# Patient Record
Sex: MALE | Race: WHITE | NOT HISPANIC OR LATINO
[De-identification: names, ages, dates, MRNs, and addresses within clinical notes are randomized per-mention and may not be internally consistent; named-entity substitution may affect disease eponyms.]

---

## 2017-02-06 RX ORDER — LISINOPRIL 5 MG/1
TABLET ORAL
Qty: 90 TABLET | Refills: 0 | Status: SHIPPED | OUTPATIENT
Start: 2017-02-06 | End: 2017-04-11

## 2017-03-06 RX ORDER — SIMVASTATIN 10 MG
TABLET ORAL
Qty: 90 TABLET | Refills: 0 | Status: SHIPPED | OUTPATIENT
Start: 2017-03-06 | End: 2017-06-11

## 2017-04-06 ENCOUNTER — PRIOR ORIGINAL RECORDS (OUTPATIENT)
Dept: OTHER | Age: 62
End: 2017-04-06

## 2017-04-06 ENCOUNTER — HOSPITAL ENCOUNTER (OUTPATIENT)
Dept: CT IMAGING | Facility: HOSPITAL | Age: 62
Discharge: HOME | End: 2017-04-06
Attending: INTERNAL MEDICINE

## 2017-04-06 DIAGNOSIS — Z13.9 SCREENING: ICD-10-CM

## 2017-04-11 RX ORDER — LISINOPRIL 5 MG/1
TABLET ORAL
Qty: 90 TABLET | Refills: 0 | Status: SHIPPED | OUTPATIENT
Start: 2017-04-11 | End: 2017-08-07

## 2017-04-26 ENCOUNTER — TELEPHONE (OUTPATIENT)
Dept: OTHER | Facility: HOSPITAL | Age: 62
End: 2017-04-26

## 2017-04-26 NOTE — PROGRESS NOTES
Patient had Heart Scan completed on 4/6/17, in addition to his calcium score, he had an incidental finding of Dilated Thoracic Aorta. Dr. Charles Province office has contacted patient and he has a follow up appointment on 5/20/17.

## 2017-05-20 ENCOUNTER — OFFICE VISIT (OUTPATIENT)
Dept: FAMILY MEDICINE CLINIC | Facility: CLINIC | Age: 62
End: 2017-05-20

## 2017-05-20 VITALS
RESPIRATION RATE: 16 BRPM | SYSTOLIC BLOOD PRESSURE: 128 MMHG | WEIGHT: 231 LBS | DIASTOLIC BLOOD PRESSURE: 88 MMHG | HEART RATE: 82 BPM | BODY MASS INDEX: 32 KG/M2 | TEMPERATURE: 98 F

## 2017-05-20 DIAGNOSIS — I10 ESSENTIAL HYPERTENSION: Primary | ICD-10-CM

## 2017-05-20 DIAGNOSIS — E78.5 HYPERLIPIDEMIA, UNSPECIFIED HYPERLIPIDEMIA TYPE: ICD-10-CM

## 2017-05-20 DIAGNOSIS — K21.9 GASTROESOPHAGEAL REFLUX DISEASE, ESOPHAGITIS PRESENCE NOT SPECIFIED: ICD-10-CM

## 2017-05-20 PROCEDURE — 99214 OFFICE O/P EST MOD 30 MIN: CPT | Performed by: FAMILY MEDICINE

## 2017-05-20 NOTE — PATIENT INSTRUCTIONS
Controlling Your Cholesterol  Cholesterol is a waxy substance. It travels in your blood through the blood vessels. When you have high cholesterol, it builds up in the walls of the blood vessels. This makes the vessels narrower. Blood flow decreases.  You · Choose an activity you enjoy. Walking, swimming, and riding a bike are some good ways to be active. · Start at a level where you feel comfortable. Increase your time and pace a little each week.   · Work up to 40 minutes of moderate to high intensity phy Lipids are fats. Blood is mostly water. Fat and water don't mix. So our bodies need lipoproteins (lipids inside a protein shell) to carry the lipids.  The protein shell carries its lipids through the bloodstream. There are two main kinds of lipoproteins:  · · If you haven't been exercising regularly, start slowly. Check with your doctor to make sure the exercise plan is right for you. · Quit smoking. Quitting smoking can improve your lipid levels. It also lowers your risk for heart disease and stroke.   · Ara Almeida A blood pressure reading is made up of two numbers: a higher number over a lower number. The top number is the systolic pressure.  The bottom number is the diastolic pressure. A normal blood pressure is less than 120 over less than 80.  High blood pressure · Learn how to handle stress. This is an important part of any program to lower blood pressure. Learn about relaxation methods like meditation, yoga, or biofeedback. · If your provider prescribed medicines, take them exactly as directed.  Missing doses may

## 2017-05-20 NOTE — PROGRESS NOTES
[facility] Office Note  Chief Complaint:   Patient presents with:  Medication Follow-Up  Hypertension  Hyperlipidemia      HPI:   This is a 60year old male coming in for follow up of HTN, hyperlipidemia and GERD.     1.  HTN - Pat drinks daily    Family History:  Family History   Problem Relation Age of Onset   • Cancer Father    • gout [Other] [OTHER] Father    • Hypertension Father      Allergies:  No Known Allergies  Current Meds:    Current Outpatient Prescriptions:  lisinopril calculated from the following:    Height as of 4/19/17: 71\". Weight as of this encounter: 231 lb. Vital signs reviewed. Appears stated age, well groomed.   Physical Exam:  GEN:  Patient is alert and oriented x3, no apparent distress  HEAD:  Normocephal symptoms. Patient is to call with any side effects or complications from the treatments as a result of today.      Problem List:  Patient Active Problem List:     Infective otitis externa, unspecified     Encounter for long-term (current) use of other medi

## 2017-06-07 RX ORDER — OMEPRAZOLE 20 MG/1
CAPSULE, DELAYED RELEASE ORAL
Qty: 90 CAPSULE | Refills: 0 | Status: SHIPPED | OUTPATIENT
Start: 2017-06-07 | End: 2017-08-15

## 2017-06-10 ENCOUNTER — APPOINTMENT (OUTPATIENT)
Dept: LAB | Facility: HOSPITAL | Age: 62
End: 2017-06-10
Attending: FAMILY MEDICINE
Payer: COMMERCIAL

## 2017-06-10 ENCOUNTER — PRIOR ORIGINAL RECORDS (OUTPATIENT)
Dept: OTHER | Age: 62
End: 2017-06-10

## 2017-06-10 DIAGNOSIS — I10 ESSENTIAL HYPERTENSION: ICD-10-CM

## 2017-06-10 DIAGNOSIS — K21.9 GASTROESOPHAGEAL REFLUX DISEASE, ESOPHAGITIS PRESENCE NOT SPECIFIED: ICD-10-CM

## 2017-06-10 DIAGNOSIS — E78.5 HYPERLIPIDEMIA, UNSPECIFIED HYPERLIPIDEMIA TYPE: ICD-10-CM

## 2017-06-10 DIAGNOSIS — E78.1 HYPERTRIGLYCERIDEMIA: ICD-10-CM

## 2017-06-10 PROCEDURE — 83735 ASSAY OF MAGNESIUM: CPT

## 2017-06-10 PROCEDURE — 80053 COMPREHEN METABOLIC PANEL: CPT

## 2017-06-10 PROCEDURE — 36415 COLL VENOUS BLD VENIPUNCTURE: CPT

## 2017-06-10 PROCEDURE — 80061 LIPID PANEL: CPT

## 2017-06-12 ENCOUNTER — TELEPHONE (OUTPATIENT)
Dept: FAMILY MEDICINE CLINIC | Facility: CLINIC | Age: 62
End: 2017-06-12

## 2017-06-12 DIAGNOSIS — E78.1 HIGH TRIGLYCERIDES: Primary | ICD-10-CM

## 2017-06-12 RX ORDER — SIMVASTATIN 10 MG
TABLET ORAL
Qty: 90 TABLET | Refills: 0 | Status: SHIPPED | OUTPATIENT
Start: 2017-06-12 | End: 2017-09-12 | Stop reason: DRUGHIGH

## 2017-08-07 RX ORDER — LISINOPRIL 5 MG/1
TABLET ORAL
Qty: 90 TABLET | Refills: 0 | Status: SHIPPED | OUTPATIENT
Start: 2017-08-07 | End: 2017-11-06

## 2017-08-15 RX ORDER — OMEPRAZOLE 20 MG/1
CAPSULE, DELAYED RELEASE ORAL
Qty: 90 CAPSULE | Refills: 0 | Status: SHIPPED | OUTPATIENT
Start: 2017-08-15 | End: 2017-11-27

## 2017-08-15 NOTE — TELEPHONE ENCOUNTER
Not protocol medication. LOV 5-20-17 med check htn hyperlipidemia  Next appointment not yet made. Please see pending medication. Refill if appropriate.    Last refill:  6-7-17 omeprazole

## 2017-09-05 ENCOUNTER — PATIENT MESSAGE (OUTPATIENT)
Dept: FAMILY MEDICINE CLINIC | Facility: CLINIC | Age: 62
End: 2017-09-05

## 2017-09-06 NOTE — TELEPHONE ENCOUNTER
Please advise patient that Dr. Bryon Mendoza is out of the office and will address upon his return.   After advising patient, please route back to Dr. Bryon Mendoza

## 2017-09-12 RX ORDER — SIMVASTATIN 20 MG
20 TABLET ORAL NIGHTLY
Qty: 90 TABLET | Refills: 0 | Status: SHIPPED | OUTPATIENT
Start: 2017-09-12 | End: 2017-12-15

## 2017-09-30 ENCOUNTER — PRIOR ORIGINAL RECORDS (OUTPATIENT)
Dept: OTHER | Age: 62
End: 2017-09-30

## 2017-10-10 ENCOUNTER — TELEPHONE (OUTPATIENT)
Dept: FAMILY MEDICINE CLINIC | Facility: CLINIC | Age: 62
End: 2017-10-10

## 2017-10-10 DIAGNOSIS — E78.1 HYPERTRIGLYCERIDEMIA: Primary | ICD-10-CM

## 2017-11-07 RX ORDER — LISINOPRIL 5 MG/1
TABLET ORAL
Qty: 30 TABLET | Refills: 0 | Status: SHIPPED | OUTPATIENT
Start: 2017-11-07 | End: 2017-12-15

## 2017-11-21 ENCOUNTER — PRIOR ORIGINAL RECORDS (OUTPATIENT)
Dept: OTHER | Age: 62
End: 2017-11-21

## 2017-11-21 LAB
ALKALINE PHOSPHATATE(ALK PHOS): 39 IU/L
BILIRUBIN TOTAL: 0.7 MG/DL
BUN: 12 MG/DL
CALCIUM: 9.8 MG/DL
CHLORIDE: 102 MEQ/L
CHOLESTEROL, TOTAL: 164 MG/DL
CREATININE, SERUM: 1.13 MG/DL
GLUCOSE: 89 MG/DL
HDL CHOLESTEROL: 38 MG/DL
HEMATOCRIT: 45.9 %
HEMOGLOBIN: 15.2 G/DL
LDL CHOLESTEROL: 81 MG/DL
PLATELETS: 204 K/UL
POTASSIUM, SERUM: 4.9 MEQ/L
PROTEIN, TOTAL: 6.9 G/DL
RED BLOOD COUNT: 4.64 X 10-6/U
SGOT (AST): 54 IU/L
SGPT (ALT): 37 IU/L
SODIUM: 142 MEQ/L
TRIGLYCERIDES: 225 MG/DL
UFCT: 30.04 CA SCORE
VITAMIN D 25-OH: 42.2 NG/ML
WHITE BLOOD COUNT: 5.7 X 10-3/U

## 2017-11-22 LAB
CHOLESTEROL, TOTAL: 162 MG/DL
HDL CHOLESTEROL: 41 MG/DL
LDL CHOLESTEROL: 83 MG/DL
TRIGLYCERIDES: 191 MG/DL

## 2017-11-27 RX ORDER — OMEPRAZOLE 20 MG/1
CAPSULE, DELAYED RELEASE ORAL
Qty: 90 CAPSULE | Refills: 0 | Status: SHIPPED | OUTPATIENT
Start: 2017-11-27 | End: 2018-02-24

## 2017-11-27 NOTE — TELEPHONE ENCOUNTER
Not protocol medication. LOV : 5/20/17 med check   Last labs done :6/10/17  Next appointment :12/15/17  Please see pending medication. Refill if appropriate.    Last refill:    Date:8/15/17  Amount :90 capsules no refill   Medication: omeprazole

## 2017-12-08 ENCOUNTER — OFFICE VISIT (OUTPATIENT)
Dept: FAMILY MEDICINE CLINIC | Facility: CLINIC | Age: 62
End: 2017-12-08

## 2017-12-08 VITALS
RESPIRATION RATE: 16 BRPM | OXYGEN SATURATION: 98 % | TEMPERATURE: 99 F | DIASTOLIC BLOOD PRESSURE: 84 MMHG | SYSTOLIC BLOOD PRESSURE: 140 MMHG | HEART RATE: 90 BPM

## 2017-12-08 DIAGNOSIS — H60.501 ACUTE OTITIS EXTERNA OF RIGHT EAR, UNSPECIFIED TYPE: Primary | ICD-10-CM

## 2017-12-08 PROCEDURE — 99213 OFFICE O/P EST LOW 20 MIN: CPT | Performed by: PHYSICIAN ASSISTANT

## 2017-12-08 RX ORDER — NEOMYCIN SULFATE, POLYMYXIN B SULFATE, HYDROCORTISONE 3.5; 10000; 1 MG/ML; [USP'U]/ML; MG/ML
SOLUTION/ DROPS AURICULAR (OTIC)
Qty: 1 BOTTLE | Refills: 0 | Status: SHIPPED | OUTPATIENT
Start: 2017-12-08 | End: 2018-04-10 | Stop reason: ALTCHOICE

## 2017-12-08 NOTE — PROGRESS NOTES
CHIEF COMPLAINT:   Patient presents with:  Ear Problem: right ear discomfort. sound is more muted.        HPI:     Delos Harada is a 60year old male who presents to clinic today with complaints of right ear discomfort and muted hearing for a couple 10/06/11   • Other and unspecified hyperlipidemia    • Unspecified essential hypertension    • Visual impairment       Social History:  Smoking status: Never Smoker                                                              Smokeless tobacco: Never Used Otic Solution 1 Bottle 0      Si gtts in right ear qid for 7 days             Risk and benefits of medication discussed. Stressed importance of completing full course of antibiotic. Tylenol/Motrin prn pain.       Call or follow up with pcp if s/sx worse

## 2017-12-12 ENCOUNTER — PRIOR ORIGINAL RECORDS (OUTPATIENT)
Dept: OTHER | Age: 62
End: 2017-12-12

## 2017-12-15 ENCOUNTER — OFFICE VISIT (OUTPATIENT)
Dept: FAMILY MEDICINE CLINIC | Facility: CLINIC | Age: 62
End: 2017-12-15

## 2017-12-15 VITALS
SYSTOLIC BLOOD PRESSURE: 130 MMHG | RESPIRATION RATE: 14 BRPM | HEART RATE: 80 BPM | WEIGHT: 225 LBS | HEIGHT: 71 IN | BODY MASS INDEX: 31.5 KG/M2 | DIASTOLIC BLOOD PRESSURE: 80 MMHG | TEMPERATURE: 98 F

## 2017-12-15 DIAGNOSIS — Z00.00 ROUTINE GENERAL MEDICAL EXAMINATION AT A HEALTH CARE FACILITY: Primary | ICD-10-CM

## 2017-12-15 DIAGNOSIS — Z12.11 COLON CANCER SCREENING: ICD-10-CM

## 2017-12-15 DIAGNOSIS — B07.9 VIRAL WARTS, UNSPECIFIED TYPE: ICD-10-CM

## 2017-12-15 PROCEDURE — 82272 OCCULT BLD FECES 1-3 TESTS: CPT

## 2017-12-15 PROCEDURE — 99396 PREV VISIT EST AGE 40-64: CPT | Performed by: FAMILY MEDICINE

## 2017-12-15 PROCEDURE — 17110 DESTRUCTION B9 LES UP TO 14: CPT | Performed by: FAMILY MEDICINE

## 2017-12-15 RX ORDER — SIMVASTATIN 20 MG
20 TABLET ORAL NIGHTLY
Qty: 90 TABLET | Refills: 1 | Status: SHIPPED | OUTPATIENT
Start: 2017-12-15 | End: 2018-06-15

## 2017-12-15 RX ORDER — LISINOPRIL 5 MG/1
TABLET ORAL
Qty: 90 TABLET | Refills: 1 | Status: SHIPPED | OUTPATIENT
Start: 2017-12-15 | End: 2018-05-18 | Stop reason: ALTCHOICE

## 2017-12-15 NOTE — PATIENT INSTRUCTIONS
Prevention Guidelines, Men Ages 48 to 59  Screening tests and vaccines are an important part of managing your health. Health counseling is essential, too. Below are guidelines for these, for men ages 48 to 59.  Talk with your healthcare provider to make s Prostate cancer Starting at age 39, talk to healthcare provider about risks and benefits of digital rectal exam (MARY) and prostate-specific antigen (PSA) screening1 At routine exams   Syphilis Men at increased risk for infection – talk with your healthcare pertussis (Td/Tdap) booster All men in this age group Td every 10 years, or a one-time dose of Tdap instead of a Td booster after age 25, then Td every 8 years   Zoster All men ages 61 and older 1 dose   Counseling Who needs it How often   Diet and exerci

## 2017-12-15 NOTE — PROCEDURES
After obtaining verbal consent and discussing possible need for another treatment, one wart was frozen with cryotherapy ×3 on left middle finger. Patient tolerated procedure well. He was told to repeat the procedure in 3-4 weeks if still symptomatic.

## 2017-12-15 NOTE — PROGRESS NOTES
Lashon Mitchell is a 60year old male who presents for a complete physical exam.   HPI:   This is a 59-year-old male who presents for an annual physical exam.  He has no complaints or concerns today. Denies any recent illnesses or hospitalizations.   Co DAILY. Disp: 90 tablet Rfl: 1   Neomycin-Polymyxin-HC (CORTISPORIN) 1 % Otic Solution 4 gtts in right ear qid for 7 days Disp: 1 Bottle Rfl: 0   OMEPRAZOLE 20 MG Oral Capsule Delayed Release TAKE 1 CAPSULE (20 MG TOTAL) BY MOUTH EVERY MORNING.  Disp: 90 cap otherwise  SKIN: denies any unusual skin lesions, + wart  EYES:denies blurred vision or double vision  HEENT: denies nasal congestion, sinus pain or ST  LUNGS: denies shortness of breath with exertion  CARDIOVASCULAR: denies chest pain on exertion or at re three times weekly.    Health maintenance, will check:   Orders Placed This Encounter      Occult Blood, Fecal, Immunoassay (FIT) [E]    Colonoscopy up-to-date per patient done in 2015 - check fecal occult stool test  PSA normal in Sept 2017  Routine fastin

## 2017-12-21 ENCOUNTER — APPOINTMENT (OUTPATIENT)
Dept: LAB | Age: 62
End: 2017-12-21
Attending: FAMILY MEDICINE
Payer: COMMERCIAL

## 2017-12-21 DIAGNOSIS — Z12.11 COLON CANCER SCREENING: ICD-10-CM

## 2018-01-03 ENCOUNTER — HOSPITAL ENCOUNTER (OUTPATIENT)
Dept: CV DIAGNOSTICS | Facility: HOSPITAL | Age: 63
Discharge: HOME | End: 2018-01-03
Attending: INTERNAL MEDICINE

## 2018-01-03 ENCOUNTER — MYAURORA ACCOUNT LINK (OUTPATIENT)
Dept: OTHER | Age: 63
End: 2018-01-03

## 2018-01-03 ENCOUNTER — PRIOR ORIGINAL RECORDS (OUTPATIENT)
Dept: OTHER | Age: 63
End: 2018-01-03

## 2018-01-03 ENCOUNTER — HOSPITAL ENCOUNTER (OUTPATIENT)
Dept: CARDIOLOGY CLINIC | Facility: HOSPITAL | Age: 63
Discharge: HOME | End: 2018-01-03
Attending: INTERNAL MEDICINE

## 2018-01-03 DIAGNOSIS — I71.2 ASCENDING AORTIC ANEURYSM (HCC): ICD-10-CM

## 2018-01-03 DIAGNOSIS — I10 BENIGN HYPERTENSION: ICD-10-CM

## 2018-01-05 ENCOUNTER — PRIOR ORIGINAL RECORDS (OUTPATIENT)
Dept: OTHER | Age: 63
End: 2018-01-05

## 2018-02-26 RX ORDER — OMEPRAZOLE 20 MG/1
CAPSULE, DELAYED RELEASE ORAL
Qty: 90 CAPSULE | Refills: 1 | Status: SHIPPED | OUTPATIENT
Start: 2018-02-26 | End: 2018-06-15

## 2018-04-10 ENCOUNTER — OFFICE VISIT (OUTPATIENT)
Dept: FAMILY MEDICINE CLINIC | Facility: CLINIC | Age: 63
End: 2018-04-10

## 2018-04-10 VITALS
RESPIRATION RATE: 16 BRPM | HEART RATE: 86 BPM | TEMPERATURE: 99 F | SYSTOLIC BLOOD PRESSURE: 130 MMHG | OXYGEN SATURATION: 98 % | DIASTOLIC BLOOD PRESSURE: 80 MMHG

## 2018-04-10 DIAGNOSIS — J40 BRONCHITIS: Primary | ICD-10-CM

## 2018-04-10 PROCEDURE — 99213 OFFICE O/P EST LOW 20 MIN: CPT | Performed by: PHYSICIAN ASSISTANT

## 2018-04-10 RX ORDER — LORATADINE 10 MG/1
10 TABLET ORAL DAILY
COMMUNITY
End: 2019-06-24

## 2018-04-10 RX ORDER — AZITHROMYCIN 250 MG/1
TABLET, FILM COATED ORAL
Qty: 6 TABLET | Refills: 0 | Status: SHIPPED | OUTPATIENT
Start: 2018-04-10 | End: 2018-05-18 | Stop reason: ALTCHOICE

## 2018-04-10 RX ORDER — PREDNISONE 20 MG/1
TABLET ORAL
Qty: 10 TABLET | Refills: 0 | Status: SHIPPED | OUTPATIENT
Start: 2018-04-10 | End: 2018-05-18 | Stop reason: ALTCHOICE

## 2018-04-10 NOTE — PROGRESS NOTES
CHIEF COMPLAINT:   Patient presents with:  Bronchitis        HPI:   Narinder Ponce is a 60year old male who presents for uri with nasal congestion and cough for  6  days. Feels more wheezing in the past 2 days. \"Tickle in chest\" but no chest pain. Medical History:   Diagnosis Date   • Alopecia, unspecified    • Hardy's esophagus     x2   • Benign neoplasm of colon    • Esophageal reflux    • Hernia of unspecified site of abdominal cavity without mention of obstruction or gangrene    • Lipid screen into the lungs every 4 to 6 hours. Take 2 puffs q 4-6 prn wheeze. azithromycin (ZITHROMAX Z-MAYA) 250 MG Oral Tab 6 tablet 0      Sig: Take two tablets by mouth today, then one tablet daily.            Side effects, risks, benefits, of medication explai

## 2018-05-18 ENCOUNTER — OFFICE VISIT (OUTPATIENT)
Dept: FAMILY MEDICINE CLINIC | Facility: CLINIC | Age: 63
End: 2018-05-18

## 2018-05-18 VITALS
WEIGHT: 228 LBS | RESPIRATION RATE: 18 BRPM | TEMPERATURE: 98 F | OXYGEN SATURATION: 98 % | DIASTOLIC BLOOD PRESSURE: 80 MMHG | HEART RATE: 88 BPM | HEIGHT: 71.25 IN | SYSTOLIC BLOOD PRESSURE: 130 MMHG | BODY MASS INDEX: 31.57 KG/M2

## 2018-05-18 DIAGNOSIS — I71.2 ASCENDING AORTIC ANEURYSM (HCC): ICD-10-CM

## 2018-05-18 DIAGNOSIS — I10 ESSENTIAL HYPERTENSION: Primary | ICD-10-CM

## 2018-05-18 DIAGNOSIS — E78.5 HYPERLIPIDEMIA, UNSPECIFIED HYPERLIPIDEMIA TYPE: ICD-10-CM

## 2018-05-18 DIAGNOSIS — K21.9 GASTROESOPHAGEAL REFLUX DISEASE, ESOPHAGITIS PRESENCE NOT SPECIFIED: ICD-10-CM

## 2018-05-18 PROBLEM — I71.21 ASCENDING AORTIC ANEURYSM: Status: ACTIVE | Noted: 2018-05-18

## 2018-05-18 PROBLEM — I71.21 ASCENDING AORTIC ANEURYSM (HCC): Status: ACTIVE | Noted: 2018-05-18

## 2018-05-18 PROCEDURE — 99214 OFFICE O/P EST MOD 30 MIN: CPT | Performed by: FAMILY MEDICINE

## 2018-05-18 RX ORDER — LORATADINE 10 MG/1
TABLET ORAL
COMMUNITY
Start: 2017-11-18 | End: 2018-05-18

## 2018-05-18 RX ORDER — CHOLECALCIFEROL (VITAMIN D3) 25 MCG
CAPSULE ORAL
COMMUNITY
Start: 2016-12-01

## 2018-05-18 RX ORDER — VALSARTAN 80 MG/1
80 TABLET ORAL DAILY
Qty: 90 TABLET | Refills: 0 | Status: SHIPPED | OUTPATIENT
Start: 2018-05-18 | End: 2018-06-15

## 2018-05-18 RX ORDER — SPIRONOLACTONE 25 MG
TABLET ORAL
COMMUNITY
Start: 2015-01-01 | End: 2018-12-17

## 2018-05-18 NOTE — PATIENT INSTRUCTIONS
-My fitness pal benny   Please change diet  start exercising. Start exercise to 3-5 times a week for 30-60 minutes which will improve youir cholesterol levels and protect your heart.   - Foods high in omega-3's help good cholesterol and may lower bad cholest · Limit saturated fats and trans fats. Saturated fats raise your levels of cholesterol, so keep these fats to a minimum. They are found in foods such as fatty meats, whole milk, cheese, and palm and coconut oils.  Avoid trans fats because they lower good ch · Healthy fats can be good for you in small amounts. These are unsaturated fats, such as olive oil, nuts, and fish. Try to have at least 2 servings per week of fatty fish, such as salmon, sardines, mackerel, rainbow trout, and albacore tuna.  These contain

## 2018-05-19 NOTE — PROGRESS NOTES
CHIEF COMPLAINT: Patient presents with:  Establish Care: medication    HPI:     Pinky Lindsay is a 58year old male presents for establish care, medication refill.     Hypertension-exercising 3 times a week bikes 20 miles and taking only lisinopril 5 m CHOLECYSTECTOMY  2008: COLONOSCOPY  No date: OTHER SURGICAL HISTORY      Comment: Upper Gastrointestinal Endoscopy  No date: OTHER SURGICAL HISTORY      Comment: gallbladder surgery  No date: OTHER SURGICAL HISTORY      Comment: Uvulectomy  No date: VASECT mouth daily.  Disp:  Rfl:        Allergies:  No Known Allergies    PSFH elements reviewed from today and agreed except as otherwise stated in HPI.  ROS:     Review of Systems  Positive for stated complaint: Cough on higher doses of lisinopril, denies dizzin monitoring-several times a week  Labs this weekend in 6 months  - COMP METABOLIC PANEL (14); Future    2.  Hyperlipidemia, unspecified hyperlipidemia type  Continue simvastatin  Consider changing to atorvastatin if TGs and other parameters are not improving mention of obstruction or gangrene     Essential hypertension     Hyperlipemia     Hypertension     Right bicipital tenosynovitis     Ascending aortic aneurysm [facility])      Imaging & Referrals:  None     5/18/2018  Maribell Hussein, DO      Patient understands

## 2018-05-22 ENCOUNTER — NURSE ONLY (OUTPATIENT)
Dept: FAMILY MEDICINE CLINIC | Facility: CLINIC | Age: 63
End: 2018-05-22

## 2018-05-22 DIAGNOSIS — I10 ESSENTIAL HYPERTENSION: ICD-10-CM

## 2018-05-22 DIAGNOSIS — I71.2 ASCENDING AORTIC ANEURYSM (HCC): ICD-10-CM

## 2018-05-22 DIAGNOSIS — E78.5 HYPERLIPIDEMIA, UNSPECIFIED HYPERLIPIDEMIA TYPE: ICD-10-CM

## 2018-05-22 PROCEDURE — 80053 COMPREHEN METABOLIC PANEL: CPT | Performed by: FAMILY MEDICINE

## 2018-05-22 PROCEDURE — 80061 LIPID PANEL: CPT | Performed by: FAMILY MEDICINE

## 2018-05-22 PROCEDURE — 36415 COLL VENOUS BLD VENIPUNCTURE: CPT | Performed by: FAMILY MEDICINE

## 2018-05-29 ENCOUNTER — TELEPHONE (OUTPATIENT)
Dept: FAMILY MEDICINE CLINIC | Facility: CLINIC | Age: 63
End: 2018-05-29

## 2018-05-29 NOTE — TELEPHONE ENCOUNTER
Spoke with pt, reviewed results and recommendations, pt verbalized understanding    Notes recorded by Elsa Talley DO on 5/23/2018 at 10:24 PM CDT  CMP-K elevated. Suspect lysed specimen. Pt repeat K level at hospital ordered. Please inform.  Also verify

## 2018-06-15 DIAGNOSIS — I10 ESSENTIAL HYPERTENSION: ICD-10-CM

## 2018-06-18 RX ORDER — SIMVASTATIN 20 MG
20 TABLET ORAL NIGHTLY
Qty: 90 TABLET | Refills: 1 | Status: SHIPPED | OUTPATIENT
Start: 2018-06-18 | End: 2018-07-09

## 2018-06-18 RX ORDER — VALSARTAN 80 MG/1
80 TABLET ORAL DAILY
Qty: 90 TABLET | Refills: 1 | Status: SHIPPED | OUTPATIENT
Start: 2018-06-18 | End: 2018-12-18

## 2018-06-18 RX ORDER — OMEPRAZOLE 20 MG/1
CAPSULE, DELAYED RELEASE ORAL
Qty: 90 CAPSULE | Refills: 1 | Status: SHIPPED | OUTPATIENT
Start: 2018-06-18 | End: 2019-03-11

## 2018-07-09 ENCOUNTER — OFFICE VISIT (OUTPATIENT)
Dept: FAMILY MEDICINE CLINIC | Facility: CLINIC | Age: 63
End: 2018-07-09

## 2018-07-09 VITALS
SYSTOLIC BLOOD PRESSURE: 124 MMHG | HEART RATE: 84 BPM | OXYGEN SATURATION: 97 % | TEMPERATURE: 99 F | BODY MASS INDEX: 31 KG/M2 | RESPIRATION RATE: 16 BRPM | WEIGHT: 225.63 LBS | DIASTOLIC BLOOD PRESSURE: 70 MMHG

## 2018-07-09 DIAGNOSIS — I10 ESSENTIAL HYPERTENSION: ICD-10-CM

## 2018-07-09 DIAGNOSIS — E78.2 MIXED HYPERLIPIDEMIA: Primary | ICD-10-CM

## 2018-07-09 PROCEDURE — 99213 OFFICE O/P EST LOW 20 MIN: CPT | Performed by: FAMILY MEDICINE

## 2018-07-09 RX ORDER — SIMVASTATIN 20 MG
20 TABLET ORAL NIGHTLY
Qty: 90 TABLET | Refills: 1 | Status: SHIPPED | OUTPATIENT
Start: 2018-07-09 | End: 2018-12-17

## 2018-07-09 NOTE — PROGRESS NOTES
CHIEF COMPLAINT: Patient presents with: Follow - Up: blood pressure    HPI:     Lorne Ventura is a 58year old male presents for recheck blood pressure-on valsartan. Denies side effects. No tongue or lip swelling. No dizziness.   Blood pressures ha gastroenterologist at [facility].  He is managed on omeprazole. And next upper endoscopy is in the next 3-5 years. Stools are normal.  No diarrhea.   Reports concerned about bone density on omeprazole had DEXA scan 10/15/2011 with no abnormalit VALSARTAN 80 MG Oral Tab TAKE 1 TABLET (80 MG TOTAL) BY MOUTH DAILY. Disp: 90 tablet Rfl: 1   Lutein 20 MG Oral Cap  Disp:  Rfl:    NON FORMULARY Take 7.5 mg by mouth 2 (two) times daily.  CBD 15 mg for inflammation  Disp:  Rfl:    [place] Month(s) from this visit.    Latest known visit with results is:   Appointment on 12/21/2017   Component Date Value   • Occult Blood Result 12/15/2017 Negative for Occult Blood    • Occult Blood Result 12/15/2017 Negative for Occult Blood    • Occult Blood There are no barriers to learning. Medical education done. Outcome: Patient verbalizes understanding. Patient is notified to call with any questions, comp lications, allergies, or worsening or changing symptoms.   Patient is to call with any side effects

## 2018-07-09 NOTE — PATIENT INSTRUCTIONS
Tips to Control Acid Reflux    To control acid reflux, you’ll need to make some basic diet and lifestyle changes. The simple steps outlined below may be all you’ll need to ease discomfort. Watch what you eat  · Avoid fatty foods and spicy foods.   · Eat Your cholesterol is checked with a simple blood test. The results tell you how much cholesterol you have in your blood. Get checked as often as your healthcare provider suggests.  If you have diabetes or high cholesterol, you may need your blood tested as o · Triglyceride is a type of fat the body uses to store energy. Too much triglyceride can increase your risk for heart disease. Triglyceride levels should be under 150. My triglyceride is:  ________________  Based on your other health issues and risk factors Make a plan to have regular cholesterol checks. You may need to fast before getting this test. Also ask your provider about any side effects your medicines may cause. Let your provider know about any side effects you have.  You may need to take more than on

## 2018-07-20 ENCOUNTER — TELEPHONE (OUTPATIENT)
Dept: FAMILY MEDICINE CLINIC | Facility: CLINIC | Age: 63
End: 2018-07-20

## 2018-07-20 NOTE — TELEPHONE ENCOUNTER
Informed patient of voluntary recall of Valsartan affecting certain manufacturers and certain lot numbers.      Informed patient to contact pharmacy to verify  prescription is not affected by the recall.      If patient’s prescription is affected, pat

## 2018-07-26 ENCOUNTER — TELEPHONE (OUTPATIENT)
Dept: FAMILY MEDICINE CLINIC | Facility: CLINIC | Age: 63
End: 2018-07-26

## 2018-07-26 NOTE — TELEPHONE ENCOUNTER
Spoke to patient, he stated that he pharmacy was able to change manufacturers for the valsartan for one that is not recalled.

## 2018-08-15 ENCOUNTER — TELEPHONE (OUTPATIENT)
Dept: FAMILY MEDICINE CLINIC | Facility: CLINIC | Age: 63
End: 2018-08-15

## 2018-08-15 NOTE — TELEPHONE ENCOUNTER
Spoke with pt, he has been using Valsartan 160 mg tabs and breaking them in half. Pt would like to go back to Lisinopril 5 mg temporarily until issues with Valsartan recall is resolved.  Pt will be monitoring his BP at home and will call if BP is not contro

## 2018-10-31 ENCOUNTER — TELEPHONE (OUTPATIENT)
Dept: FAMILY MEDICINE CLINIC | Facility: CLINIC | Age: 63
End: 2018-10-31

## 2018-11-02 NOTE — TELEPHONE ENCOUNTER
Spoke to pt, informed him we received labs from wellbeing encounter with his job.  recommends pt come in for office visit to discuss lab results. Pt states he is aware of his results and has already made diet changes which includes lean meats, increasing

## 2018-11-05 ENCOUNTER — PRIOR ORIGINAL RECORDS (OUTPATIENT)
Dept: OTHER | Age: 63
End: 2018-11-05

## 2018-11-06 ENCOUNTER — PRIOR ORIGINAL RECORDS (OUTPATIENT)
Dept: OTHER | Age: 63
End: 2018-11-06

## 2018-11-21 ENCOUNTER — TELEPHONE (OUTPATIENT)
Dept: FAMILY MEDICINE CLINIC | Facility: CLINIC | Age: 63
End: 2018-11-21

## 2018-11-21 NOTE — TELEPHONE ENCOUNTER
Spoke with pt, informed him that orders for labs are on file and he may completed before his appointment, pt verbalized understanding    Future Appointments   Date Time Provider Andrew Sanders   12/10/2018  8:15 AM [facility] CARD ECHO RM [address] Lucrecia Rosario

## 2018-12-10 ENCOUNTER — MYAURORA ACCOUNT LINK (OUTPATIENT)
Dept: OTHER | Age: 63
End: 2018-12-10

## 2018-12-10 ENCOUNTER — HOSPITAL ENCOUNTER (OUTPATIENT)
Dept: CV DIAGNOSTICS | Facility: HOSPITAL | Age: 63
Discharge: HOME | End: 2018-12-10
Attending: INTERNAL MEDICINE

## 2018-12-10 DIAGNOSIS — I71.2 THORACIC AORTIC ANEURYSM WITHOUT RUPTURE (HCC): ICD-10-CM

## 2018-12-17 ENCOUNTER — PRIOR ORIGINAL RECORDS (OUTPATIENT)
Dept: OTHER | Age: 63
End: 2018-12-17

## 2018-12-17 ENCOUNTER — OFFICE VISIT (OUTPATIENT)
Dept: FAMILY MEDICINE CLINIC | Facility: CLINIC | Age: 63
End: 2018-12-17
Payer: COMMERCIAL

## 2018-12-17 VITALS
SYSTOLIC BLOOD PRESSURE: 112 MMHG | RESPIRATION RATE: 16 BRPM | HEIGHT: 71.2 IN | WEIGHT: 216.63 LBS | OXYGEN SATURATION: 97 % | HEART RATE: 79 BPM | DIASTOLIC BLOOD PRESSURE: 80 MMHG | TEMPERATURE: 98 F | BODY MASS INDEX: 29.99 KG/M2

## 2018-12-17 DIAGNOSIS — Z13.0 SCREENING FOR ENDOCRINE, NUTRITIONAL, METABOLIC AND IMMUNITY DISORDER: ICD-10-CM

## 2018-12-17 DIAGNOSIS — E78.2 MIXED HYPERLIPIDEMIA: ICD-10-CM

## 2018-12-17 DIAGNOSIS — I10 ESSENTIAL HYPERTENSION: ICD-10-CM

## 2018-12-17 DIAGNOSIS — Z00.00 ANNUAL PHYSICAL EXAM: Primary | ICD-10-CM

## 2018-12-17 DIAGNOSIS — Z13.0 SCREENING FOR DEFICIENCY ANEMIA: ICD-10-CM

## 2018-12-17 DIAGNOSIS — Z13.29 SCREENING FOR ENDOCRINE, NUTRITIONAL, METABOLIC AND IMMUNITY DISORDER: ICD-10-CM

## 2018-12-17 DIAGNOSIS — Z13.21 SCREENING FOR ENDOCRINE, NUTRITIONAL, METABOLIC AND IMMUNITY DISORDER: ICD-10-CM

## 2018-12-17 DIAGNOSIS — Z13.228 SCREENING FOR ENDOCRINE, NUTRITIONAL, METABOLIC AND IMMUNITY DISORDER: ICD-10-CM

## 2018-12-17 PROCEDURE — 36415 COLL VENOUS BLD VENIPUNCTURE: CPT | Performed by: FAMILY MEDICINE

## 2018-12-17 PROCEDURE — 80053 COMPREHEN METABOLIC PANEL: CPT | Performed by: FAMILY MEDICINE

## 2018-12-17 PROCEDURE — 80061 LIPID PANEL: CPT | Performed by: FAMILY MEDICINE

## 2018-12-17 PROCEDURE — 99396 PREV VISIT EST AGE 40-64: CPT | Performed by: FAMILY MEDICINE

## 2018-12-17 PROCEDURE — 84443 ASSAY THYROID STIM HORMONE: CPT | Performed by: FAMILY MEDICINE

## 2018-12-17 RX ORDER — LOTEPREDNOL ETABONATE 5 MG/ML
SUSPENSION/ DROPS OPHTHALMIC
Refills: 2 | COMMUNITY
Start: 2018-08-31 | End: 2018-12-17 | Stop reason: ALTCHOICE

## 2018-12-17 RX ORDER — SIMVASTATIN 20 MG
20 TABLET ORAL NIGHTLY
Qty: 90 TABLET | Refills: 1 | COMMUNITY
Start: 2018-12-17 | End: 2018-12-17

## 2018-12-17 RX ORDER — AMPICILLIN TRIHYDRATE 250 MG
CAPSULE ORAL
Refills: 0 | COMMUNITY
Start: 2018-12-17 | End: 2020-06-23

## 2018-12-17 RX ORDER — AMPICILLIN TRIHYDRATE 250 MG
CAPSULE ORAL
COMMUNITY
Start: 2015-03-01 | End: 2018-12-17

## 2018-12-17 NOTE — PROGRESS NOTES
Patient presents with:  Physical-Other: annual physical, pt states no complaints or concerns      REASON FOR VISIT:    Bereket Boston is a 58year old male who presents for an [address]. Patient presents for recheck of hypertension.  P times daily. CBD 15 mg for inflammation  Disp:  Rfl:    MAGNESIUM MALATE OR Take 2 g by mouth. Disp:  Rfl:    Cholecalciferol (VITAMIN D-3) 1000 units Oral Cap  Disp:  Rfl:    loratadine 10 MG Oral Tab Take 10 mg by mouth daily.  Disp:  Rfl:    Multiple Vit No    Annoyed : No    Guilty : No    Eye Opener : No    Scoring  Total Score: 0     Depression Screening (PHQ-2/PHQ-9): Over the LAST 2 WEEKS   Little interest or pleasure in doing things (over the last two weeks)?: Not at all    Feeling down, depressed, o Monitoring:    SPECIFIC DISEASE MONITORING Internal Lab or Procedure External Lab or Procedure   Annual Monitoring of Persistent     Medications (ACE/ARB, digoxin, diuretics)    Potassium  Annually Potassium (mmol/L)   Date Value   05/22/2018 5.2 (H)     P Disp:  Rfl:       MEDICAL INFORMATION:   Past Medical History:   Diagnosis Date   • Alopecia, unspecified    • Hardy's esophagus     x2   • Benign neoplasm of colon    • Esophageal reflux    • Hernia of unspecified site of abdominal cavity without mentio history  ALL/ASTHMA: denies hx of allergy or asthma    EXAM:   /80 (BP Location: Right arm, Patient Position: Sitting, Cuff Size: adult)   Pulse 79   Temp 98.2 °F (36.8 °C) (Oral)   Resp 16   Ht 71.2\"   Wt 216 lb 9.6 oz   SpO2 97%   BMI 30.04 kg/m² acid (ALA) in diet salmon 2x weekly, walnuts, almonds,flax seed, fatty fish, spinach, chata seeds, grass feed dairy-milk,eggs, sarah lettuce, green beans, shrimp, strawberries and raspberries  -Encourage healthy diet with whole foods i.e. fruits and veget Administered   • Influenza 11/07/2012, 11/06/2013, 11/19/2014, 09/30/2016, 09/30/2017, 09/10/2018   • TDAP 01/07/2013   • Varicella Deferred (Had Chicken Pox) 04/13/1960   • Zoster Vaccine Live (Zostavax) 10/26/2015       Influenza Annually   Pneumococcal

## 2018-12-17 NOTE — PATIENT INSTRUCTIONS
Perform labs fasting 8 hours with water or black coffee or or black tea diet  soda only prior to exam.    -Encourage healthy diet of whole food and avoid processed food and sugary drinks and sodas.   Diet should include lean meats and vegetables including age group Yearly checkup if your blood pressure is normal  Normal blood pressure is less than 120/80 mm Hg  If your blood pressure reading is higher than normal, follow the advice of your healthcare provider      Colorectal cancer All men in this age group provider   Vaccine Who needs it How often   Chickenpox (varicella) All men in this age group who have no record of this infection or vaccine 2 doses; second dose should be given at least 4 weeks after the first dose   Hepatitis A Men at increased risk for aspirin Men in this age group at risk for cardiovascular health problems At routine exams   Use of tobacco and the health effects it can cause All men in this age group Every visit   [address] [place] [address] [facility]  Date Last Reviewed: 2/1/2017  © 2 asthma? · Talking with your provider before beginning a new exercise program is a good idea for anyone. Will I lose weight? Many of us would like to lose or keep off a few pounds. Being more active each day and building muscle can help.  Here’s how:  · B calcium  · Soft bones caused by low vitamin D or problems using it (osteomalacia)  · Osteopenia  · Osteoporosis  · Rickets, in children  You may also need this test if you are at risk for low vitamin D levels.  Risks include:  · Being an older adult  · Havi carries some risks. These include bleeding, infection, bruising, and feeling lightheaded. When the needle pricks your arm or hand, you may feel a slight sting or pain. Afterward, the site may be sore.   What might affect my test results?   The amount of josé miguel \"good cholesterol. \" This protein shell collects excess cholesterol that LDLs have left behind on blood vessel walls. That's why high levels of HDL cholesterol can decrease your risk of heart disease and stroke.   Controlling cholesterol levels  Total chol to get their LDL levels to a safe level. Medicine to lower cholesterol levels is effective and safe. Taking medicine is not a substitute for exercise or watching your diet!  Your healthcare provider can tell you whether you might benefit from a cholesterol- fewer processed foods are two great ways to decrease the amount of salt you consume. · Managing calories. A calorie is a unit of energy. Your body burns calories for fuel, but if you eat more calories than your body burns, the extras are stored as fat.  Agustin Lee plan to eat two servings, double all the numbers on the label. Prepare food right  A key part of healthy cooking is cutting down on added fat and salt. Look on the internet for lower-fat, lower-sodium recipes.  Also, try these tips:  · Remove fat from meat

## 2018-12-18 ENCOUNTER — PRIOR ORIGINAL RECORDS (OUTPATIENT)
Dept: OTHER | Age: 63
End: 2018-12-18

## 2018-12-18 ENCOUNTER — MYAURORA ACCOUNT LINK (OUTPATIENT)
Dept: OTHER | Age: 63
End: 2018-12-18

## 2018-12-18 LAB
CHOLESTEROL, TOTAL: 135 MG/DL
HDL CHOLESTEROL: 41 MG/DL
LDL CHOLESTEROL: 73 MG/DL
TRIGLYCERIDES: 104 MG/DL

## 2018-12-18 RX ORDER — SIMVASTATIN 20 MG
20 TABLET ORAL NIGHTLY
Qty: 90 TABLET | Refills: 1 | Status: SHIPPED | OUTPATIENT
Start: 2018-12-18 | End: 2019-06-24

## 2018-12-18 RX ORDER — VALSARTAN 80 MG/1
80 TABLET ORAL DAILY
Qty: 90 TABLET | Refills: 1 | Status: SHIPPED | OUTPATIENT
Start: 2018-12-18 | End: 2019-06-24

## 2019-01-02 LAB
ALBUMIN: 4 G/DL
ALKALINE PHOSPHATATE(ALK PHOS): 43 IU/L
BILIRUBIN TOTAL: 0.7 MG/DL
BUN: 12 MG/DL
CALCIUM: 8.6 MG/DL
CHLORIDE: 107 MEQ/L
CHOLESTEROL, TOTAL: 135 MG/DL
CREATININE, SERUM: 1.02 MG/DL
GLOBULIN: 3.5 G/DL
GLUCOSE: 89 MG/DL
HDL CHOLESTEROL: 41 MG/DL
LDL CHOLESTEROL: 73 MG/DL
POTASSIUM, SERUM: 3.8 MEQ/L
PROTEIN, TOTAL: 7.5 G/DL
SGOT (AST): 18 IU/L
SGPT (ALT): 29 IU/L
SODIUM: 140 MEQ/L
THYROID STIMULATING HORMONE: 1.1 MLU/L
TRIGLYCERIDES: 104 MG/DL

## 2019-01-11 ENCOUNTER — MED REC SCAN ONLY (OUTPATIENT)
Dept: FAMILY MEDICINE CLINIC | Facility: CLINIC | Age: 64
End: 2019-01-11

## 2019-02-28 VITALS
DIASTOLIC BLOOD PRESSURE: 70 MMHG | HEIGHT: 71 IN | SYSTOLIC BLOOD PRESSURE: 120 MMHG | HEART RATE: 80 BPM | BODY MASS INDEX: 30.38 KG/M2 | WEIGHT: 217 LBS

## 2019-02-28 VITALS
SYSTOLIC BLOOD PRESSURE: 122 MMHG | BODY MASS INDEX: 31.5 KG/M2 | HEART RATE: 64 BPM | DIASTOLIC BLOOD PRESSURE: 70 MMHG | WEIGHT: 225 LBS | HEIGHT: 71 IN

## 2019-03-08 RX ORDER — OMEPRAZOLE 20 MG/1
CAPSULE, DELAYED RELEASE ORAL
Qty: 90 CAPSULE | Refills: 1 | OUTPATIENT
Start: 2019-03-08

## 2019-03-11 RX ORDER — OMEPRAZOLE 20 MG/1
CAPSULE, DELAYED RELEASE ORAL
Qty: 90 CAPSULE | Refills: 1 | Status: SHIPPED | OUTPATIENT
Start: 2019-03-11 | End: 2019-08-29

## 2019-03-11 NOTE — TELEPHONE ENCOUNTER
Pt requesting refill of omeprazole, no protocol, unable to approve:     Last Time Medication was Filled:  6/18/18 #90 with 1 refill    Last Office Visit with PCP: 12/17/18    No future appointments.

## 2019-04-19 RX ORDER — VALSARTAN 80 MG/1
80 TABLET ORAL DAILY
COMMUNITY
Start: 2018-12-18

## 2019-04-19 RX ORDER — PERPHENAZINE/AMITRIPTYLINE HCL 4 MG-25 MG
40 TABLET ORAL DAILY
COMMUNITY
Start: 2017-12-12

## 2019-04-19 RX ORDER — UBIDECARENONE 50 MG
CAPSULE ORAL
COMMUNITY

## 2019-04-19 RX ORDER — SIMVASTATIN 20 MG
TABLET ORAL NIGHTLY
COMMUNITY
Start: 2013-12-20

## 2019-04-19 RX ORDER — OMEGA-3 FATTY ACIDS/FISH OIL 300-1000MG
CAPSULE ORAL
COMMUNITY
Start: 2017-12-12

## 2019-04-19 RX ORDER — OMEPRAZOLE 20 MG/1
20 CAPSULE, DELAYED RELEASE ORAL DAILY
COMMUNITY
Start: 2013-12-20

## 2019-06-24 ENCOUNTER — OFFICE VISIT (OUTPATIENT)
Dept: FAMILY MEDICINE CLINIC | Facility: CLINIC | Age: 64
End: 2019-06-24
Payer: COMMERCIAL

## 2019-06-24 VITALS
WEIGHT: 215.69 LBS | SYSTOLIC BLOOD PRESSURE: 114 MMHG | BODY MASS INDEX: 29.86 KG/M2 | TEMPERATURE: 98 F | RESPIRATION RATE: 18 BRPM | HEIGHT: 71.25 IN | DIASTOLIC BLOOD PRESSURE: 76 MMHG | HEART RATE: 76 BPM | OXYGEN SATURATION: 96 %

## 2019-06-24 DIAGNOSIS — Z12.11 COLON CANCER SCREENING: ICD-10-CM

## 2019-06-24 DIAGNOSIS — I77.810 DILATATION OF THORACIC AORTA (HCC): ICD-10-CM

## 2019-06-24 DIAGNOSIS — I10 ESSENTIAL HYPERTENSION: Primary | ICD-10-CM

## 2019-06-24 DIAGNOSIS — E78.2 MIXED HYPERLIPIDEMIA: ICD-10-CM

## 2019-06-24 DIAGNOSIS — Z23 NEED FOR VACCINATION: ICD-10-CM

## 2019-06-24 DIAGNOSIS — I71.2 ASCENDING AORTIC ANEURYSM (HCC): ICD-10-CM

## 2019-06-24 DIAGNOSIS — Z86.010 HISTORY OF ADENOMATOUS POLYP OF COLON: ICD-10-CM

## 2019-06-24 DIAGNOSIS — K22.70 BARRETT'S ESOPHAGUS DETERMINED BY ENDOSCOPY: ICD-10-CM

## 2019-06-24 PROCEDURE — 99214 OFFICE O/P EST MOD 30 MIN: CPT | Performed by: FAMILY MEDICINE

## 2019-06-24 PROCEDURE — 80053 COMPREHEN METABOLIC PANEL: CPT | Performed by: FAMILY MEDICINE

## 2019-06-24 PROCEDURE — 90732 PPSV23 VACC 2 YRS+ SUBQ/IM: CPT | Performed by: FAMILY MEDICINE

## 2019-06-24 PROCEDURE — 90471 IMMUNIZATION ADMIN: CPT | Performed by: FAMILY MEDICINE

## 2019-06-24 PROCEDURE — 36415 COLL VENOUS BLD VENIPUNCTURE: CPT | Performed by: FAMILY MEDICINE

## 2019-06-24 PROCEDURE — 80061 LIPID PANEL: CPT | Performed by: FAMILY MEDICINE

## 2019-06-24 RX ORDER — OMEGA-3 FATTY ACIDS/FISH OIL 300-1000MG
CAPSULE ORAL
COMMUNITY
Start: 2017-12-12 | End: 2019-12-17 | Stop reason: ALTCHOICE

## 2019-06-24 RX ORDER — SIMVASTATIN 20 MG
20 TABLET ORAL NIGHTLY
Qty: 90 TABLET | Refills: 1 | Status: SHIPPED | OUTPATIENT
Start: 2019-06-24 | End: 2019-12-17

## 2019-06-24 RX ORDER — VALSARTAN 80 MG/1
80 TABLET ORAL DAILY
Qty: 90 TABLET | Refills: 1 | Status: SHIPPED | OUTPATIENT
Start: 2019-06-24 | End: 2019-12-17

## 2019-06-24 RX ORDER — PERPHENAZINE/AMITRIPTYLINE HCL 4 MG-25 MG
TABLET ORAL
COMMUNITY
Start: 2017-12-12

## 2019-06-24 RX ORDER — FLUOROMETHOLONE 0.1 %
1 SUSPENSION, DROPS(FINAL DOSAGE FORM)(ML) OPHTHALMIC (EYE)
COMMUNITY
Start: 2019-06-04

## 2019-06-24 NOTE — PATIENT INSTRUCTIONS
As of October 6th 2014, the Drug Enforcement Agency [facility]) is reclassifying all hydrocodone combination medications from Schedule III to Schedule II. This includes medications such as Norco, Vicodin, Lortab, Zohydro, and Vicoprofen.      What this means for the blood vessels. When you have high cholesterol, it can build up along the walls of the blood vessels. This makes the vessels narrower and decreases blood flow. You are then at greater risk of having a heart attack or a stroke.   Good and bad cholesterol at a level where you feel comfortable. Increase your time and pace a little each week. · Work up to 30 to 40 minutes of moderate to high intensity physical activity at least 3 to 4 days per week. · Remember, some activity is better than none.   · If you h

## 2019-06-24 NOTE — PROGRESS NOTES
CHIEF COMPLAINT: Patient presents with:  Medication Follow-Up: HTN and Cholesterol       HPI:     Bryce Cabrera is a 71year old male presents for medication monitoring. Pt presents  for recheck of hyperlipidemia.  Patient reports taking medications SURGICAL HISTORY      gallbladder surgery   • OTHER SURGICAL HISTORY      Uvulectomy   • VASECTOMY        Family History   Problem Relation Age of Onset   • Cancer Father         stomach   • Hypertension Father    • Other (gout) Father    • Other (hepatiti Temp 98.3 °F (36.8 °C) (Oral)   Resp 18   Ht 71.25\"   Wt 215 lb 11.2 oz   SpO2 96%   BMI 29.87 kg/m²   Vital signs reviewed. Appears stated age, well groomed.   Physical Exam  GENERAL: well developed, well nourished,in no apparent distress  EYES; PERRLA, EO • Chol/HDL Ratio 09/29/2018 4.30       REVIEWED THIS VISIT  ASSESSMENT/PLAN:   71year old male with    1. Colon cancer screening  4.  History of Tubular adenoma of colon  - GASTRO - INTERNAL -performed prior lower endoscopy  Due for repeat Physical due on 12/17/2019  Influenza Vaccine Completed      Patient/Caregiver Education: Patient/Caregiver Education: There are no barriers to learning. Medical education done. Outcome: Patient verbalizes understanding.  Patient is notified to call with

## 2019-06-24 NOTE — PROGRESS NOTES
Patient came in for draw of ordered fasting labs. Patient drawn out of Left AC, x 1 attempt and tolerated well. 1 light green tube drawn.

## 2019-06-27 ENCOUNTER — TELEPHONE (OUTPATIENT)
Dept: FAMILY MEDICINE CLINIC | Facility: CLINIC | Age: 64
End: 2019-06-27

## 2019-06-27 NOTE — TELEPHONE ENCOUNTER
----- Message from Karen Alonzo DO sent at 6/25/2019 11:35 PM CDT -----  Labs are normal except triglycerides are mildly elevated.   Triglycerides will improve with low-carb diet less than 100 g of carb daily, avoiding fast food, eating a Mediterranean ty

## 2019-07-01 NOTE — TELEPHONE ENCOUNTER
Patient reports reading his test results on [employer]. Patient states he does not have any questions at this time.

## 2019-08-29 DIAGNOSIS — K21.9 GASTROESOPHAGEAL REFLUX DISEASE, ESOPHAGITIS PRESENCE NOT SPECIFIED: ICD-10-CM

## 2019-08-29 DIAGNOSIS — K22.70 BARRETT'S ESOPHAGUS DETERMINED BY ENDOSCOPY: Primary | ICD-10-CM

## 2019-08-29 RX ORDER — OMEPRAZOLE 20 MG/1
CAPSULE, DELAYED RELEASE ORAL
Qty: 90 CAPSULE | Refills: 0 | Status: SHIPPED | OUTPATIENT
Start: 2019-08-29 | End: 2019-11-23

## 2019-08-29 NOTE — TELEPHONE ENCOUNTER
Pt requesting refill of OMEPRAZOLE 20 MG Oral Capsule Delayed Release    Last Time Medication was Filled:  3/11/19     Last Office Visit with PCP: 6/24/2019. Return for annual physical-send labs 10/2019 from work screen. No future appointments.

## 2019-09-10 ENCOUNTER — TELEPHONE (OUTPATIENT)
Dept: CARDIOLOGY | Age: 64
End: 2019-09-10

## 2019-09-10 DIAGNOSIS — I71.20 THORACIC AORTIC ANEURYSM WITHOUT RUPTURE (CMD): Primary | ICD-10-CM

## 2019-10-03 DIAGNOSIS — I71.20 THORACIC AORTIC ANEURYSM WITHOUT RUPTURE (CMD): Primary | ICD-10-CM

## 2019-10-07 ENCOUNTER — HOSPITAL ENCOUNTER (OUTPATIENT)
Dept: CV DIAGNOSTICS | Facility: HOSPITAL | Age: 64
Discharge: HOME | End: 2019-10-07
Attending: INTERNAL MEDICINE
Payer: COMMERCIAL

## 2019-10-07 DIAGNOSIS — I71.20 THORACIC AORTIC ANEURYSM WITHOUT RUPTURE (CMD): ICD-10-CM

## 2019-10-07 DIAGNOSIS — I71.2 THORACIC AORTIC ANEURYSM WITHOUT RUPTURE (HCC): ICD-10-CM

## 2019-10-07 PROCEDURE — 93306 TTE W/DOPPLER COMPLETE: CPT | Performed by: INTERNAL MEDICINE

## 2019-10-08 ENCOUNTER — TELEPHONE (OUTPATIENT)
Dept: CARDIOLOGY | Age: 64
End: 2019-10-08

## 2019-11-23 DIAGNOSIS — K21.9 GASTROESOPHAGEAL REFLUX DISEASE, ESOPHAGITIS PRESENCE NOT SPECIFIED: ICD-10-CM

## 2019-11-25 RX ORDER — OMEPRAZOLE 20 MG/1
CAPSULE, DELAYED RELEASE ORAL
Qty: 90 CAPSULE | Refills: 0 | Status: SHIPPED | OUTPATIENT
Start: 2019-11-25 | End: 2020-02-22

## 2019-11-25 NOTE — TELEPHONE ENCOUNTER
Requested Prescriptions     Signed Prescriptions Disp Refills   • omeprazole 20 MG Oral Capsule Delayed Release 90 capsule 0     Sig: TAKE ONE CAPSULE BY MOUTH EVERY MORNING     Authorizing Provider: Fran Mcknight     Ordering User: Charlene Pagan

## 2019-12-12 ENCOUNTER — OFFICE VISIT (OUTPATIENT)
Dept: CARDIOLOGY | Age: 64
End: 2019-12-12

## 2019-12-12 VITALS
HEART RATE: 68 BPM | WEIGHT: 217 LBS | DIASTOLIC BLOOD PRESSURE: 90 MMHG | SYSTOLIC BLOOD PRESSURE: 130 MMHG | BODY MASS INDEX: 30.38 KG/M2 | HEIGHT: 71 IN

## 2019-12-12 DIAGNOSIS — I10 ESSENTIAL HYPERTENSION: ICD-10-CM

## 2019-12-12 DIAGNOSIS — I71.21 ASCENDING AORTIC ANEURYSM (CMD): Primary | ICD-10-CM

## 2019-12-12 DIAGNOSIS — E78.2 MIXED HYPERLIPIDEMIA: ICD-10-CM

## 2019-12-12 DIAGNOSIS — E78.1 HYPERTRIGLYCERIDEMIA: ICD-10-CM

## 2019-12-12 PROCEDURE — 99214 OFFICE O/P EST MOD 30 MIN: CPT | Performed by: INTERNAL MEDICINE

## 2019-12-12 PROCEDURE — 3075F SYST BP GE 130 - 139MM HG: CPT | Performed by: INTERNAL MEDICINE

## 2019-12-12 RX ORDER — MULTIVITAMIN
TABLET ORAL
COMMUNITY

## 2019-12-12 RX ORDER — FLUOROMETHOLONE 0.1 %
SUSPENSION, DROPS(FINAL DOSAGE FORM)(ML) OPHTHALMIC (EYE)
Refills: 3 | COMMUNITY
Start: 2019-11-23

## 2019-12-12 RX ORDER — CHOLECALCIFEROL (VITAMIN D3) 25 MCG
CAPSULE ORAL
COMMUNITY
Start: 2016-12-01

## 2019-12-12 ASSESSMENT — ENCOUNTER SYMPTOMS
HEMOPTYSIS: 0
FEVER: 0
WEIGHT LOSS: 0
CHILLS: 0
HEMATOCHEZIA: 0
SUSPICIOUS LESIONS: 0
COUGH: 0
BRUISES/BLEEDS EASILY: 0
WEIGHT GAIN: 0
ALLERGIC/IMMUNOLOGIC COMMENTS: NO NEW FOOD ALLERGIES

## 2019-12-12 ASSESSMENT — PATIENT HEALTH QUESTIONNAIRE - PHQ9
2. FEELING DOWN, DEPRESSED OR HOPELESS: NOT AT ALL
1. LITTLE INTEREST OR PLEASURE IN DOING THINGS: NOT AT ALL
SUM OF ALL RESPONSES TO PHQ9 QUESTIONS 1 AND 2: 0
SUM OF ALL RESPONSES TO PHQ9 QUESTIONS 1 AND 2: 0

## 2019-12-17 ENCOUNTER — APPOINTMENT (OUTPATIENT)
Dept: CARDIOLOGY | Age: 64
End: 2019-12-17

## 2019-12-17 ENCOUNTER — OFFICE VISIT (OUTPATIENT)
Dept: FAMILY MEDICINE CLINIC | Facility: CLINIC | Age: 64
End: 2019-12-17
Payer: COMMERCIAL

## 2019-12-17 VITALS
TEMPERATURE: 99 F | BODY MASS INDEX: 30.24 KG/M2 | HEIGHT: 71 IN | OXYGEN SATURATION: 98 % | RESPIRATION RATE: 18 BRPM | DIASTOLIC BLOOD PRESSURE: 80 MMHG | WEIGHT: 216 LBS | HEART RATE: 90 BPM | SYSTOLIC BLOOD PRESSURE: 118 MMHG

## 2019-12-17 DIAGNOSIS — Z13.228 SCREENING FOR ENDOCRINE, NUTRITIONAL, METABOLIC AND IMMUNITY DISORDER: ICD-10-CM

## 2019-12-17 DIAGNOSIS — Z13.29 SCREENING FOR ENDOCRINE, NUTRITIONAL, METABOLIC AND IMMUNITY DISORDER: ICD-10-CM

## 2019-12-17 DIAGNOSIS — Z12.5 SCREENING FOR PROSTATE CANCER: ICD-10-CM

## 2019-12-17 DIAGNOSIS — Z13.21 SCREENING FOR ENDOCRINE, NUTRITIONAL, METABOLIC AND IMMUNITY DISORDER: ICD-10-CM

## 2019-12-17 DIAGNOSIS — Z00.00 ANNUAL PHYSICAL EXAM: Primary | ICD-10-CM

## 2019-12-17 DIAGNOSIS — Z13.0 SCREENING FOR IRON DEFICIENCY ANEMIA: ICD-10-CM

## 2019-12-17 DIAGNOSIS — Z13.0 SCREENING FOR ENDOCRINE, NUTRITIONAL, METABOLIC AND IMMUNITY DISORDER: ICD-10-CM

## 2019-12-17 DIAGNOSIS — E78.2 MIXED HYPERLIPIDEMIA: ICD-10-CM

## 2019-12-17 DIAGNOSIS — I10 ESSENTIAL HYPERTENSION: ICD-10-CM

## 2019-12-17 DIAGNOSIS — Z13.6 SCREENING FOR HEART DISEASE: ICD-10-CM

## 2019-12-17 PROCEDURE — 99397 PER PM REEVAL EST PAT 65+ YR: CPT | Performed by: FAMILY MEDICINE

## 2019-12-17 RX ORDER — VALSARTAN 80 MG/1
80 TABLET ORAL DAILY
Qty: 90 TABLET | Refills: 0 | Status: SHIPPED | OUTPATIENT
Start: 2019-12-17 | End: 2019-12-17

## 2019-12-17 RX ORDER — SIMVASTATIN 20 MG
20 TABLET ORAL NIGHTLY
Qty: 90 TABLET | Refills: 1 | Status: SHIPPED | OUTPATIENT
Start: 2019-12-17 | End: 2020-06-23

## 2019-12-17 RX ORDER — SIMVASTATIN 20 MG
20 TABLET ORAL NIGHTLY
Qty: 90 TABLET | Refills: 0 | Status: SHIPPED | OUTPATIENT
Start: 2019-12-17 | End: 2019-12-17

## 2019-12-17 RX ORDER — VALSARTAN 80 MG/1
80 TABLET ORAL DAILY
Qty: 90 TABLET | Refills: 1 | Status: SHIPPED | OUTPATIENT
Start: 2019-12-17 | End: 2020-06-09 | Stop reason: ALTCHOICE

## 2019-12-17 NOTE — PATIENT INSTRUCTIONS
As of October 6th 2014, the Drug Enforcement Agency [facility]) is reclassifying all hydrocodone combination medications from Schedule III to Schedule II. This includes medications such as Norco, Vicodin, Lortab, Zohydro, and Vicoprofen.      What this means for daily   Exercise brisk walk 30 mins or more 5 or more days weekly   3 small meals and 2 snacks   Whole food as much as possible->3 veg daily, lean meat, fish, nuts,  legumes, foods rich in omega 3, avocado oil, olive oil, salmon, almonds.  Avoid processed f more testing is needed.  Health counseling is essential, too. Below are guidelines for these, for men ages 72 and older. Talk with your healthcare provider to make sure you’re up-to-date on what you need.   Screening Who needs it How often   Abdominal aorti healthcare provider about risks and benefits of digital rectal exam (MARY) and prostate-specific antigen (PSA) screening1 At routine exams   Syphilis Men at increased risk for infection – talk with your healthcare provider At routine exams   Tuberculosis Me in this age group At routine exams   Sexually transmitted infection Men at increased risk for infection – talk with your healthcare provider At routine exams   Use of daily aspirin Men ages 39 to 78 at risk for cardiovascular health problems At routine exa

## 2019-12-17 NOTE — PROGRESS NOTES
Patient presents with:  Physical      REASON FOR VISIT:    Lorne Ventura is a 71year old male who presents for an [address]. Patient presents for recheck of hypertension.  Pt has been taking medications as instructed, no medication s without mention of obstruction or gangrene     Essential hypertension     Hyperlipemia     Hypertension     Right bicipital tenosynovitis     Ascending aortic aneurysm (HCC)     Dilatation of thoracic aorta [facility])    Current Outpatient Medications   Medicat CARO 1.13 05/22/2018     No results found for: PSA    General Health     How would you describe your current health state?: Good    Type of Diet: (mediterrainan )    How do you maintain positive mental well-being?: Social Interaction    How would you GLUCOSE (mg/dL)   Date Value   03/27/2014 104 (H)   10/06/2011 85         Gonorrhea Screening If high risk No results found for: GONOCOCCUS    HIV Screening For all adults age 22-65, older adults at increased risk No results found for: HIV    Syphilis CAPSULE BY MOUTH EVERY MORNING 90 capsule 0   • fluorometholone 0.1 % Ophthalmic Suspension 1 drop. • Lutein 40 MG Oral Cap daily     • NON FORMULARY       • valsartan 80 MG Oral Tab Take 1 tablet (80 mg total) by mouth daily.  90 tablet 1   • simvastat lesions  EYES: denies blurred vision or double vision  HEENT: denies nasal congestion, sinus pain or ST  LUNGS: denies shortness of breath with exertion  CARDIOVASCULAR: denies chest pain on exertion  GI: denies abdominal pain, denies heartburn  : denies including 5-7 servings of fruit and vegetables total in 1 day.   Never skip breakfast.  -Encouraged exercise 30 minutes to 60 minutes 3-5 times weekly 150 to 300  to prevent obesity and chronic disease and eliminate stress and its effect on the body.  -enco Dispense: 90 tablet; Refill: 1  Recheck fasting lipid panel in 5 months  - LIPID PANEL; Future-recheck 5/2020      The patient indicates understanding of these issues and agrees to the plan.   Return in about 6 months (around 6/17/2020) for medication monit

## 2020-02-21 DIAGNOSIS — K21.9 GASTROESOPHAGEAL REFLUX DISEASE, ESOPHAGITIS PRESENCE NOT SPECIFIED: ICD-10-CM

## 2020-02-22 RX ORDER — OMEPRAZOLE 20 MG/1
CAPSULE, DELAYED RELEASE ORAL
Qty: 90 CAPSULE | Refills: 2 | Status: SHIPPED | OUTPATIENT
Start: 2020-02-22 | End: 2020-11-16

## 2020-04-02 ENCOUNTER — PATIENT MESSAGE (OUTPATIENT)
Dept: FAMILY MEDICINE CLINIC | Facility: CLINIC | Age: 65
End: 2020-04-02

## 2020-04-02 NOTE — TELEPHONE ENCOUNTER
From: Moraima Kenny  To: Jodie Rader DO  Sent: 4/2/2020 11:09 AM CDT  Subject: Prescription Question    1. Is it possible to shift from Valsartan to a calcium channel blocker, until more research is done on ACE2 expression and COVID?   2. I think I'

## 2020-06-09 ENCOUNTER — TELEPHONE (OUTPATIENT)
Dept: FAMILY MEDICINE CLINIC | Facility: CLINIC | Age: 65
End: 2020-06-09

## 2020-06-09 NOTE — TELEPHONE ENCOUNTER
Sukhi Siddiqui with [address] [place] [address],[facility], called stating pts blood pressure medication Valsartan, markosn back order. And they want to know if its okay to give an alternative to pt. Alternatives are Olmesartan or Losartan. Please call back at [phone].

## 2020-06-10 NOTE — TELEPHONE ENCOUNTER
Needs OV in 4 weeks to recheck blood pressure and 6 month labs. HTN and cholesterol monitored q 6months.  Please inform

## 2020-06-10 NOTE — TELEPHONE ENCOUNTER
Rx losartan sent yesterday.  Please inform needs OV in person to recheck blood pressure on new medication and due for interval recheck q 6mos

## 2020-06-23 ENCOUNTER — OFFICE VISIT (OUTPATIENT)
Dept: FAMILY MEDICINE CLINIC | Facility: CLINIC | Age: 65
End: 2020-06-23
Payer: COMMERCIAL

## 2020-06-23 VITALS
RESPIRATION RATE: 18 BRPM | SYSTOLIC BLOOD PRESSURE: 126 MMHG | BODY MASS INDEX: 17.41 KG/M2 | WEIGHT: 124.38 LBS | HEIGHT: 71 IN | OXYGEN SATURATION: 94 % | HEART RATE: 66 BPM | TEMPERATURE: 98 F | DIASTOLIC BLOOD PRESSURE: 72 MMHG

## 2020-06-23 DIAGNOSIS — Z13.0 SCREENING FOR ENDOCRINE, NUTRITIONAL, METABOLIC AND IMMUNITY DISORDER: ICD-10-CM

## 2020-06-23 DIAGNOSIS — Z13.228 SCREENING FOR ENDOCRINE, NUTRITIONAL, METABOLIC AND IMMUNITY DISORDER: ICD-10-CM

## 2020-06-23 DIAGNOSIS — Z00.00 ANNUAL PHYSICAL EXAM: ICD-10-CM

## 2020-06-23 DIAGNOSIS — Z12.5 SCREENING FOR PROSTATE CANCER: ICD-10-CM

## 2020-06-23 DIAGNOSIS — Z13.29 SCREENING FOR ENDOCRINE, NUTRITIONAL, METABOLIC AND IMMUNITY DISORDER: ICD-10-CM

## 2020-06-23 DIAGNOSIS — E78.2 MIXED HYPERLIPIDEMIA: Primary | ICD-10-CM

## 2020-06-23 DIAGNOSIS — Z23 NEED FOR PNEUMOCOCCAL VACCINE: ICD-10-CM

## 2020-06-23 DIAGNOSIS — Z13.0 SCREENING FOR IRON DEFICIENCY ANEMIA: ICD-10-CM

## 2020-06-23 DIAGNOSIS — R71.8 ELEVATED MCV: ICD-10-CM

## 2020-06-23 DIAGNOSIS — Z13.21 SCREENING FOR ENDOCRINE, NUTRITIONAL, METABOLIC AND IMMUNITY DISORDER: ICD-10-CM

## 2020-06-23 DIAGNOSIS — I71.2 ASCENDING AORTIC ANEURYSM (HCC): ICD-10-CM

## 2020-06-23 DIAGNOSIS — I10 ESSENTIAL HYPERTENSION: ICD-10-CM

## 2020-06-23 PROCEDURE — 36415 COLL VENOUS BLD VENIPUNCTURE: CPT | Performed by: FAMILY MEDICINE

## 2020-06-23 PROCEDURE — 80061 LIPID PANEL: CPT | Performed by: FAMILY MEDICINE

## 2020-06-23 PROCEDURE — 82607 VITAMIN B-12: CPT | Performed by: FAMILY MEDICINE

## 2020-06-23 PROCEDURE — 90471 IMMUNIZATION ADMIN: CPT | Performed by: FAMILY MEDICINE

## 2020-06-23 PROCEDURE — 99214 OFFICE O/P EST MOD 30 MIN: CPT | Performed by: FAMILY MEDICINE

## 2020-06-23 PROCEDURE — 90670 PCV13 VACCINE IM: CPT | Performed by: FAMILY MEDICINE

## 2020-06-23 PROCEDURE — G0103 PSA SCREENING: HCPCS | Performed by: FAMILY MEDICINE

## 2020-06-23 PROCEDURE — 80050 GENERAL HEALTH PANEL: CPT | Performed by: FAMILY MEDICINE

## 2020-06-23 RX ORDER — SIMVASTATIN 40 MG
40 TABLET ORAL NIGHTLY
Qty: 90 TABLET | Refills: 0 | Status: SHIPPED | OUTPATIENT
Start: 2020-06-23 | End: 2020-10-05

## 2020-06-23 RX ORDER — LOSARTAN POTASSIUM 50 MG/1
50 TABLET ORAL DAILY
Qty: 90 TABLET | Refills: 1 | Status: SHIPPED | OUTPATIENT
Start: 2020-06-23 | End: 2020-12-18

## 2020-06-23 NOTE — PROGRESS NOTES
CHIEF COMPLAINT: Patient presents with:  Medication Follow-Up    HPI:     [place] is a 76year old male presents for medication monitoring    Patient presents for recheck of hypertension.  Pt has been taking medications as instructed, no medicati [facility]   • OTHER SURGICAL HISTORY      gallbladder surgery   • OTHER SURGICAL HISTORY      Uvulectomy   • VASECTOMY        Family History   Problem Relation Age of Onset   • Cancer Father         stomach   • Hypertension Father signs reviewed. Appears stated age, well groomed. Physical Exam  GENERAL: well developed, well nourished,in no apparent distress  EYES; PERRLA, EOM-i B/L.  Sclera clear and non icteric bilateral  SKIN: no rashes,no suspicious lesions  HEENT: atraumatic, nor 30 mins at least 5 days weekly  Recheck fasting labs in 6 mos in fall 2020  Repeat CT calcium scan this year. Pt monitors calcifications    2. Essential hypertension  - losartan Potassium 50 MG Oral Tab; Take 1 tablet (50 mg total) by mouth daily.   Tabby Sanchez esophagus determined by endoscopy     Other symptoms involving urinary system(788.99)     Diaphragmatic hernia without mention of obstruction or gangrene     Essential hypertension     Hyperlipemia     Hypertension     Right bicipital tenosynovitis     Asc

## 2020-06-23 NOTE — PROGRESS NOTES
Patient came in for draw of ordered fasting labs. Patient drawn out of left AC, x 1 attempt and tolerated well. 1 lt grn 1l av tube drawn.

## 2020-06-23 NOTE — PATIENT INSTRUCTIONS
As of October 6th 2014, the Drug Enforcement Agency [facility]) is reclassifying all hydrocodone combination medications from Schedule III to Schedule II. This includes medications such as Norco, Vicodin, Lortab, Zohydro, and Vicoprofen.      What this means for 4/27/2016  © 7171-8008 [employer]. [address] [facility], [address] [place] [address]. All rights reserved. This information is not intended as a substitute for professional medical care. Always follow your healthcare professional's instructions. a diet plan to manage your calories. This will likely include eating healthier foods as well as exercising regularly. To help you track your progress, keep a diary to record what you eat and how often you exercise.   Choose the right foods  Aim to make thes cooking. · Skim fat from the surface of soups and sauces. · Broil, boil, bake, steam, grill, and microwave food without added fats. · Choose ingredients that spice up your food without adding calories, fat, or sodium.  Try these items: horseradish, hot s bloodstream. If your total cholesterol is high, follow the steps below to help lower your total cholesterol level:   Eat less unhealthy fat  · Cut back on saturated fats and trans fats (also called hydrogenated) by selecting lean cuts of meat, low-fat dair medicine to get their LDL levels to a safe level. Medicine to lower cholesterol levels is effective and safe. Taking medicine is not a substitute for exercise or watching your diet!  Your healthcare provider can tell you whether you might benefit from a cho and fractures by slowing the loss of bone mass (osteoporosis)  · Manage stress better  · Reduce your blood pressure  · Improve your sense of self and your body image  Exercise tips    · Ease into your routine. Set small goals. Then build on them.  If you ar neck, shoulders, back, or arms  · Abnormal shortness of breath  · More joint or muscle pain  · A very fast or irregular heartbeat (palpitations)  Lia last reviewed this educational content on 7/1/2019  © 7701-5767 [employer].  [address]

## 2020-07-21 ENCOUNTER — HOSPITAL ENCOUNTER (OUTPATIENT)
Dept: CT IMAGING | Facility: HOSPITAL | Age: 65
Discharge: HOME | End: 2020-07-21
Attending: FAMILY MEDICINE

## 2020-07-21 DIAGNOSIS — Z13.6 SCREENING FOR CARDIOVASCULAR CONDITION: ICD-10-CM

## 2020-07-21 DIAGNOSIS — Z13.9 ENCOUNTER FOR SCREENING: ICD-10-CM

## 2020-07-22 ENCOUNTER — TELEPHONE (OUTPATIENT)
Dept: FAMILY MEDICINE CLINIC | Facility: CLINIC | Age: 65
End: 2020-07-22

## 2020-07-22 NOTE — TELEPHONE ENCOUNTER
Sharon Sherman, DO  [facility] Clinical Staff             Results reviewed. Released to [address] Tests show no significant abnormalities. Reymundo Floyd,   I have reviewed your test results.  Tests show no significant abnormalities and CT over read of the chest.

## 2020-07-22 NOTE — TELEPHONE ENCOUNTER
Patient has not viewed in [employer]OM to return call to the office. Provided pt office phone [phone] along with office hours given.

## 2020-09-29 ENCOUNTER — NURSE ONLY (OUTPATIENT)
Dept: FAMILY MEDICINE CLINIC | Facility: CLINIC | Age: 65
End: 2020-09-29
Payer: COMMERCIAL

## 2020-09-29 DIAGNOSIS — E78.2 MIXED HYPERLIPIDEMIA: ICD-10-CM

## 2020-09-29 DIAGNOSIS — Z23 NEED FOR VACCINATION: Primary | ICD-10-CM

## 2020-09-29 DIAGNOSIS — R71.8 ELEVATED MCV: ICD-10-CM

## 2020-09-29 LAB
ALBUMIN SERPL-MCNC: 3.9 G/DL (ref 3.4–5)
ALBUMIN/GLOB SERPL: 1.2 {RATIO} (ref 1–2)
ALP LIVER SERPL-CCNC: 42 U/L
ALT SERPL-CCNC: 24 U/L
ANION GAP SERPL CALC-SCNC: 5 MMOL/L (ref 0–18)
AST SERPL-CCNC: 14 U/L (ref 15–37)
BASOPHILS # BLD AUTO: 0.04 X10(3) UL (ref 0–0.2)
BASOPHILS NFR BLD AUTO: 0.9 %
BILIRUB SERPL-MCNC: 0.8 MG/DL (ref 0.1–2)
BUN BLD-MCNC: 12 MG/DL (ref 7–18)
BUN/CREAT SERPL: 11.9 (ref 10–20)
CALCIUM BLD-MCNC: 9.8 MG/DL (ref 8.5–10.1)
CHLORIDE SERPL-SCNC: 108 MMOL/L (ref 98–112)
CHOLEST SMN-MCNC: 131 MG/DL (ref ?–200)
CO2 SERPL-SCNC: 27 MMOL/L (ref 21–32)
CREAT BLD-MCNC: 1.01 MG/DL
DEPRECATED RDW RBC AUTO: 44 FL (ref 35.1–46.3)
EOSINOPHIL # BLD AUTO: 0.14 X10(3) UL (ref 0–0.7)
EOSINOPHIL NFR BLD AUTO: 3 %
ERYTHROCYTE [DISTWIDTH] IN BLOOD BY AUTOMATED COUNT: 12 % (ref 11–15)
GLOBULIN PLAS-MCNC: 3.2 G/DL (ref 2.8–4.4)
GLUCOSE BLD-MCNC: 95 MG/DL (ref 70–99)
HCT VFR BLD AUTO: 43.2 %
HDLC SERPL-MCNC: 43 MG/DL (ref 40–59)
HGB BLD-MCNC: 14.5 G/DL
IMM GRANULOCYTES # BLD AUTO: 0.03 X10(3) UL (ref 0–1)
IMM GRANULOCYTES NFR BLD: 0.7 %
LDLC SERPL CALC-MCNC: 57 MG/DL (ref ?–100)
LYMPHOCYTES # BLD AUTO: 1.77 X10(3) UL (ref 1–4)
LYMPHOCYTES NFR BLD AUTO: 38.4 %
M PROTEIN MFR SERPL ELPH: 7.1 G/DL (ref 6.4–8.2)
MCH RBC QN AUTO: 33.3 PG (ref 26–34)
MCHC RBC AUTO-ENTMCNC: 33.6 G/DL (ref 31–37)
MCV RBC AUTO: 99.3 FL
MONOCYTES # BLD AUTO: 0.48 X10(3) UL (ref 0.1–1)
MONOCYTES NFR BLD AUTO: 10.4 %
NEUTROPHILS # BLD AUTO: 2.15 X10 (3) UL (ref 1.5–7.7)
NEUTROPHILS # BLD AUTO: 2.15 X10(3) UL (ref 1.5–7.7)
NEUTROPHILS NFR BLD AUTO: 46.6 %
NONHDLC SERPL-MCNC: 88 MG/DL (ref ?–130)
OSMOLALITY SERPL CALC.SUM OF ELEC: 290 MOSM/KG (ref 275–295)
PATIENT FASTING Y/N/NP: YES
PATIENT FASTING Y/N/NP: YES
PLATELET # BLD AUTO: 179 10(3)UL (ref 150–450)
POTASSIUM SERPL-SCNC: 4.4 MMOL/L (ref 3.5–5.1)
RBC # BLD AUTO: 4.35 X10(6)UL
SODIUM SERPL-SCNC: 140 MMOL/L (ref 136–145)
TRIGL SERPL-MCNC: 155 MG/DL (ref 30–149)
VLDLC SERPL CALC-MCNC: 31 MG/DL (ref 0–30)
WBC # BLD AUTO: 4.6 X10(3) UL (ref 4–11)

## 2020-09-29 PROCEDURE — 36415 COLL VENOUS BLD VENIPUNCTURE: CPT | Performed by: FAMILY MEDICINE

## 2020-09-29 PROCEDURE — 80053 COMPREHEN METABOLIC PANEL: CPT | Performed by: FAMILY MEDICINE

## 2020-09-29 PROCEDURE — 85025 COMPLETE CBC W/AUTO DIFF WBC: CPT | Performed by: FAMILY MEDICINE

## 2020-09-29 PROCEDURE — 90662 IIV NO PRSV INCREASED AG IM: CPT | Performed by: FAMILY MEDICINE

## 2020-09-29 PROCEDURE — 80061 LIPID PANEL: CPT | Performed by: FAMILY MEDICINE

## 2020-09-29 PROCEDURE — 90471 IMMUNIZATION ADMIN: CPT | Performed by: FAMILY MEDICINE

## 2020-09-29 NOTE — PROGRESS NOTES
Patient came in for draw of ordered fasting labs. Patient drawn out of L AC, x 1 attempt and tolerated well. 1 Lt grn and 1 lav tube drawn.      Administered High dose flu immunization  Patient tolerated well  VIS provided

## 2020-09-29 NOTE — PROGRESS NOTES
Results reviewed. Released to [address] Tests show no significant abnormalities.   See patient message

## 2020-10-05 DIAGNOSIS — E78.2 MIXED HYPERLIPIDEMIA: ICD-10-CM

## 2020-10-05 RX ORDER — SIMVASTATIN 40 MG
TABLET ORAL
Qty: 90 TABLET | Refills: 0 | Status: SHIPPED | OUTPATIENT
Start: 2020-10-05 | End: 2020-12-18

## 2020-10-05 NOTE — TELEPHONE ENCOUNTER
Pt requesting refill of   Requested Prescriptions     Pending Prescriptions Disp Refills   • SIMVASTATIN 40 MG Oral Tab [Pharmacy Med Name: simvastatin 40 mg tablet] 90 tablet 0     Sig: TAKE ONE TABLET BY MOUTH NIGHTLY     Passed protocol, refill approved

## 2020-11-14 DIAGNOSIS — K21.9 GASTROESOPHAGEAL REFLUX DISEASE: ICD-10-CM

## 2020-11-16 RX ORDER — OMEPRAZOLE 20 MG/1
CAPSULE, DELAYED RELEASE ORAL
Qty: 90 CAPSULE | Refills: 0 | Status: SHIPPED | OUTPATIENT
Start: 2020-11-16 | End: 2021-02-17

## 2020-11-18 ENCOUNTER — HOSPITAL ENCOUNTER (OUTPATIENT)
Dept: CV DIAGNOSTICS | Facility: HOSPITAL | Age: 65
Discharge: HOME | End: 2020-11-18
Attending: INTERNAL MEDICINE
Payer: COMMERCIAL

## 2020-11-18 DIAGNOSIS — I71.2 ASCENDING AORTIC ANEURYSM (HCC): ICD-10-CM

## 2020-11-18 PROCEDURE — 93306 TTE W/DOPPLER COMPLETE: CPT | Performed by: INTERNAL MEDICINE

## 2020-11-19 ENCOUNTER — TELEPHONE (OUTPATIENT)
Dept: CARDIOLOGY | Age: 65
End: 2020-11-19

## 2020-11-19 DIAGNOSIS — I71.21 ASCENDING AORTIC ANEURYSM (CMD): ICD-10-CM

## 2020-11-20 PROBLEM — E53.8 B12 DEFICIENCY: Status: ACTIVE | Noted: 2020-11-20

## 2020-11-23 ENCOUNTER — E-ADVICE (OUTPATIENT)
Dept: CARDIOLOGY | Age: 65
End: 2020-11-23

## 2020-11-24 ENCOUNTER — NURSE ONLY (OUTPATIENT)
Dept: FAMILY MEDICINE CLINIC | Facility: CLINIC | Age: 65
End: 2020-11-24
Payer: COMMERCIAL

## 2020-11-24 DIAGNOSIS — E53.8 B12 DEFICIENCY: ICD-10-CM

## 2020-11-24 DIAGNOSIS — M77.9 TENDONITIS: ICD-10-CM

## 2020-11-24 PROCEDURE — 36415 COLL VENOUS BLD VENIPUNCTURE: CPT | Performed by: FAMILY MEDICINE

## 2020-11-24 PROCEDURE — 82607 VITAMIN B-12: CPT | Performed by: FAMILY MEDICINE

## 2020-11-24 PROCEDURE — 82550 ASSAY OF CK (CPK): CPT | Performed by: FAMILY MEDICINE

## 2020-11-24 NOTE — PROGRESS NOTES
Patient came in for draw of ordered labs. Patient drawn out of L AC, x 1 attempt and tolerated well. 1 lt grn tube drawn.

## 2020-12-18 ENCOUNTER — OFFICE VISIT (OUTPATIENT)
Dept: FAMILY MEDICINE CLINIC | Facility: CLINIC | Age: 65
End: 2020-12-18
Payer: COMMERCIAL

## 2020-12-18 VITALS
WEIGHT: 209.63 LBS | HEART RATE: 80 BPM | RESPIRATION RATE: 18 BRPM | HEIGHT: 71 IN | TEMPERATURE: 97 F | BODY MASS INDEX: 29.35 KG/M2 | OXYGEN SATURATION: 98 % | SYSTOLIC BLOOD PRESSURE: 124 MMHG | DIASTOLIC BLOOD PRESSURE: 68 MMHG

## 2020-12-18 DIAGNOSIS — I10 ESSENTIAL HYPERTENSION: ICD-10-CM

## 2020-12-18 DIAGNOSIS — Z00.00 ANNUAL PHYSICAL EXAM: Primary | ICD-10-CM

## 2020-12-18 DIAGNOSIS — E78.2 MIXED HYPERLIPIDEMIA: ICD-10-CM

## 2020-12-18 DIAGNOSIS — Z23 NEED FOR VACCINATION: ICD-10-CM

## 2020-12-18 PROCEDURE — 3078F DIAST BP <80 MM HG: CPT | Performed by: FAMILY MEDICINE

## 2020-12-18 PROCEDURE — 90471 IMMUNIZATION ADMIN: CPT | Performed by: FAMILY MEDICINE

## 2020-12-18 PROCEDURE — 3008F BODY MASS INDEX DOCD: CPT | Performed by: FAMILY MEDICINE

## 2020-12-18 PROCEDURE — 99397 PER PM REEVAL EST PAT 65+ YR: CPT | Performed by: FAMILY MEDICINE

## 2020-12-18 PROCEDURE — 90750 HZV VACC RECOMBINANT IM: CPT | Performed by: FAMILY MEDICINE

## 2020-12-18 PROCEDURE — 3074F SYST BP LT 130 MM HG: CPT | Performed by: FAMILY MEDICINE

## 2020-12-18 RX ORDER — SIMVASTATIN 40 MG
40 TABLET ORAL NIGHTLY
Qty: 90 TABLET | Refills: 0 | Status: SHIPPED | OUTPATIENT
Start: 2020-12-18 | End: 2021-04-09

## 2020-12-18 RX ORDER — LOSARTAN POTASSIUM 50 MG/1
50 TABLET ORAL DAILY
Qty: 90 TABLET | Refills: 1 | Status: SHIPPED | OUTPATIENT
Start: 2020-12-18 | End: 2021-06-28

## 2020-12-18 NOTE — PROGRESS NOTES
Patient presents with:  Physical: fasting     REASON FOR VISIT:    Pinky Lindsay is a 76year old male who presents for an [address]. Patient presents for recheck of hypertension.  Pt has been taking medications as instructed, no medi kg)        Patient Active Problem List:     Infective otitis externa, unspecified     Encounter for long-term (current) use of other medications     Hardy's esophagus determined by endoscopy     Other symptoms involving urinary system(878.99)     Jose 06/24/2019     Lab Results   Component Value Date    TSH 1.760 06/23/2020    TSH 1.100 12/17/2018    TSH 1.060 03/27/2014     Lab Results   Component Value Date    BUN 12 09/29/2020    BUN 15 06/23/2020    BUN 15 06/24/2019    CREATSERUM 1.01 09/29/2020 Recommended screening varies with age, risk and gender LDL Cholesterol (mg/dL)   Date Value   09/29/2020 57     LDL-CHOLESTEROL (mg/dL (calc))   Date Value   10/06/2011 79       Diabetes Screening  If history of high blood pressure or other  risk factors N found. Asthma  (Annually between Nov. 1 & Dec. 31)    Date of last AAP/ACT and counseling given on importance of controller meds.                  ALLERGIES:   No Known Allergies    CURRENT MEDICATIONS:   Current Outpatient Medications   Medication Sig D Problems Daughter       SOCIAL HISTORY:   Social History    Tobacco Use      Smoking status: Never Smoker      Smokeless tobacco: Never Used    Alcohol use: Yes      Comment: 2 drinks daily    Drug use: No    Occ: consultant executive  : yes       R Health Assessment. PLAN SUMMARY:   1. Annual physical exam  BMI 29  Lost 7 pounds in past year, continue to lose weight, cut alcohol  -Encourage healthy diet of whole food and avoid processed food and sugary drinks and sodas.   Diet should include lean m pressure, discuss labs sooner if needed.     Cut alcohol to one glass of wine or 1oz of bourbon  in 24 hours  Alcohol cessation couseling performed  Diet counseling perfomed  Exercise counseling perfomed    SUGGESTED VACCINATIONS - Influenza, Pneumococcal,

## 2020-12-18 NOTE — PATIENT INSTRUCTIONS
As of October 6th 2014, the Drug Enforcement Agency [facility]) is reclassifying all hydrocodone combination medications from Schedule III to Schedule II. This includes medications such as Norco, Vicodin, Lortab, Zohydro, and Vicoprofen.    What this means for yo food and sugary drinks and sodas. Diet should include lean meats and vegetables including 5-7 servings of fruit and vegetables total in 1 day.   Never skip breakfast.  -Encouraged exercise 30 minutes or more 5 times weekly to prevent obesity and chronic di

## 2021-02-17 DIAGNOSIS — K21.9 GASTROESOPHAGEAL REFLUX DISEASE: ICD-10-CM

## 2021-02-17 RX ORDER — OMEPRAZOLE 20 MG/1
CAPSULE, DELAYED RELEASE ORAL
Qty: 90 CAPSULE | Refills: 3 | Status: SHIPPED | OUTPATIENT
Start: 2021-02-17 | End: 2022-02-07

## 2021-03-13 DIAGNOSIS — Z23 NEED FOR VACCINATION: ICD-10-CM

## 2021-04-09 DIAGNOSIS — E78.2 MIXED HYPERLIPIDEMIA: ICD-10-CM

## 2021-04-09 RX ORDER — SIMVASTATIN 40 MG
TABLET ORAL
Qty: 90 TABLET | Refills: 0 | Status: SHIPPED | OUTPATIENT
Start: 2021-04-09 | End: 2021-07-23

## 2021-04-09 NOTE — TELEPHONE ENCOUNTER
Pt requesting refill of   Requested Prescriptions     Signed Prescriptions Disp Refills   • SIMVASTATIN 40 MG Oral Tab 90 tablet 0     Sig: TAKE ONE TABLET BY MOUTH NIGHTLY     Authorizing Provider: Chaim Bunn     Ordering User: Green Minus

## 2021-06-09 ENCOUNTER — LAB ENCOUNTER (OUTPATIENT)
Dept: LAB | Age: 66
End: 2021-06-09
Attending: FAMILY MEDICINE
Payer: COMMERCIAL

## 2021-06-09 DIAGNOSIS — E78.2 MIXED HYPERLIPIDEMIA: ICD-10-CM

## 2021-06-09 DIAGNOSIS — I10 ESSENTIAL HYPERTENSION: ICD-10-CM

## 2021-06-09 PROCEDURE — 80061 LIPID PANEL: CPT | Performed by: FAMILY MEDICINE

## 2021-06-09 PROCEDURE — 80053 COMPREHEN METABOLIC PANEL: CPT | Performed by: FAMILY MEDICINE

## 2021-06-18 ENCOUNTER — OFFICE VISIT (OUTPATIENT)
Dept: FAMILY MEDICINE CLINIC | Facility: CLINIC | Age: 66
End: 2021-06-18
Payer: COMMERCIAL

## 2021-06-18 VITALS
HEIGHT: 71 IN | RESPIRATION RATE: 16 BRPM | TEMPERATURE: 98 F | BODY MASS INDEX: 28.65 KG/M2 | DIASTOLIC BLOOD PRESSURE: 78 MMHG | HEART RATE: 75 BPM | SYSTOLIC BLOOD PRESSURE: 127 MMHG | WEIGHT: 204.63 LBS | OXYGEN SATURATION: 94 %

## 2021-06-18 DIAGNOSIS — E78.2 MIXED HYPERLIPIDEMIA: ICD-10-CM

## 2021-06-18 DIAGNOSIS — I10 ESSENTIAL HYPERTENSION: Primary | ICD-10-CM

## 2021-06-18 DIAGNOSIS — L98.9 SKIN LESION OF LEFT LEG: ICD-10-CM

## 2021-06-18 DIAGNOSIS — I71.2 ASCENDING AORTIC ANEURYSM (HCC): ICD-10-CM

## 2021-06-18 PROCEDURE — 3074F SYST BP LT 130 MM HG: CPT | Performed by: FAMILY MEDICINE

## 2021-06-18 PROCEDURE — 3008F BODY MASS INDEX DOCD: CPT | Performed by: FAMILY MEDICINE

## 2021-06-18 PROCEDURE — 3078F DIAST BP <80 MM HG: CPT | Performed by: FAMILY MEDICINE

## 2021-06-18 PROCEDURE — 99214 OFFICE O/P EST MOD 30 MIN: CPT | Performed by: FAMILY MEDICINE

## 2021-06-18 RX ORDER — CYANOCOBALAMIN (VITAMIN B-12) 1000 MCG
TABLET ORAL
COMMUNITY
Start: 2020-12-18

## 2021-06-18 NOTE — PROGRESS NOTES
CHIEF COMPLAINT: Patient presents with: Follow - Up: discuss lab results   Medication Follow-Up    HPI:     Leandro Reinoso is a 78year old male presents for medication monitoring    Patient presents for recheck of hypertension.  Pt has been taking med abdominal cavity without mention of obstruction or gangrene    • Lipid screening 10/06/11   • Other and unspecified hyperlipidemia    • Unspecified essential hypertension    • Visual impairment       Past Surgical History:   Procedure Laterality Date   • C Right arm, Patient Position: Sitting, Cuff Size: adult)   Pulse 75   Temp 98.1 °F (36.7 °C) (Temporal)   Resp 16   Ht 5' 11\" (1.803 m)   Wt 204 lb 9.6 oz (92.8 kg)   SpO2 94%   BMI 28.54 kg/m²   Vital signs reviewed. Appears stated age, well groomed.   Phys 06/09/2021 Yes           REVIEWED THIS VISIT  ASSESSMENT/PLAN:   78year old male with    1.  Mixed hyperlipidemia  -Continue simvastatin 40 MG Oral Tab-declined refill will call in 3 weeks when needs more medication  Optimize LDL <70   mediterranean diet Patient verbalizes understanding. Patient is notified to call with any questions, comp lications, allergies, or worsening or changing symptoms. Patient is to call with any side effects or complications from the treatments as a result of today.      Problem

## 2021-06-26 DIAGNOSIS — I10 ESSENTIAL HYPERTENSION: ICD-10-CM

## 2021-06-28 RX ORDER — LOSARTAN POTASSIUM 50 MG/1
TABLET ORAL
Qty: 90 TABLET | Refills: 1 | Status: SHIPPED | OUTPATIENT
Start: 2021-06-28 | End: 2021-11-30

## 2021-06-28 NOTE — TELEPHONE ENCOUNTER
Pt requesting refill of   Requested Prescriptions     Pending Prescriptions Disp Refills   • LOSARTAN POTASSIUM 50 MG Oral Tab [Pharmacy Med Name: losartan 50 mg tablet] 90 tablet 1     Sig: TAKE ONE TABLET BY MOUTH DAILY       Passed protocol, refill ap

## 2021-07-23 DIAGNOSIS — E78.2 MIXED HYPERLIPIDEMIA: ICD-10-CM

## 2021-07-23 RX ORDER — SIMVASTATIN 40 MG
TABLET ORAL
Qty: 90 TABLET | Refills: 0 | Status: SHIPPED | OUTPATIENT
Start: 2021-07-23 | End: 2021-11-01

## 2021-07-23 NOTE — TELEPHONE ENCOUNTER
Pt requesting refill of   Requested Prescriptions     Pending Prescriptions Disp Refills   • SIMVASTATIN 40 MG Oral Tab [Pharmacy Med Name: simvastatin 40 mg tablet] 90 tablet 0     Sig: TAKE ONE TABLET BY MOUTH NIGHTLY       Passed protocol, refill appr

## 2021-11-01 ENCOUNTER — TELEPHONE (OUTPATIENT)
Dept: CARDIOLOGY | Age: 66
End: 2021-11-01

## 2021-11-01 ENCOUNTER — PATIENT MESSAGE (OUTPATIENT)
Dept: FAMILY MEDICINE CLINIC | Facility: CLINIC | Age: 66
End: 2021-11-01

## 2021-11-01 DIAGNOSIS — I77.810 DILATATION OF THORACIC AORTA (HCC): Primary | ICD-10-CM

## 2021-11-01 DIAGNOSIS — E78.2 MIXED HYPERLIPIDEMIA: ICD-10-CM

## 2021-11-01 DIAGNOSIS — I71.2 ASCENDING AORTIC ANEURYSM (HCC): ICD-10-CM

## 2021-11-01 RX ORDER — SIMVASTATIN 40 MG
TABLET ORAL
Qty: 90 TABLET | Refills: 0 | Status: SHIPPED | OUTPATIENT
Start: 2021-11-01 | End: 2021-11-30

## 2021-11-01 NOTE — TELEPHONE ENCOUNTER
From: Zhanna Kenny  To: Michela Sever, DO  Sent: 11/1/2021 8:35 AM CDT  Subject: My annual cardio echo and FU    I've been getting annual echos to monitor what appears to be a stable ascending aortic aneurysm.  Turns out that Abdirashid has left the pra

## 2021-11-01 NOTE — TELEPHONE ENCOUNTER
Refill Passed Protocol:     Pt requesting refill of   Requested Prescriptions     Pending Prescriptions Disp Refills   • SIMVASTATIN 40 MG Oral Tab [Pharmacy Med Name: simvastatin 40 mg tablet] 90 tablet 0     Sig: TAKE ONE TABLET BY MOUTH NIGHTLY

## 2021-11-02 NOTE — TELEPHONE ENCOUNTER
_______________________________________________________  Referred to Provider Information:  Provider Address Phone   Glynn Lundborg, MD [address] [facility] Randal Mena [phone]      Dr. Zaragoza Fair complete previously worked with Dr. Cuca Mcfarland

## 2021-11-19 ENCOUNTER — MED REC SCAN ONLY (OUTPATIENT)
Dept: FAMILY MEDICINE CLINIC | Facility: CLINIC | Age: 66
End: 2021-11-19

## 2021-11-30 ENCOUNTER — OFFICE VISIT (OUTPATIENT)
Dept: FAMILY MEDICINE CLINIC | Facility: CLINIC | Age: 66
End: 2021-11-30
Payer: COMMERCIAL

## 2021-11-30 VITALS
RESPIRATION RATE: 16 BRPM | SYSTOLIC BLOOD PRESSURE: 126 MMHG | HEIGHT: 70.87 IN | DIASTOLIC BLOOD PRESSURE: 76 MMHG | WEIGHT: 208.63 LBS | HEART RATE: 64 BPM | TEMPERATURE: 98 F | OXYGEN SATURATION: 97 % | BODY MASS INDEX: 29.21 KG/M2

## 2021-11-30 DIAGNOSIS — Z13.0 SCREENING FOR ENDOCRINE, NUTRITIONAL, METABOLIC AND IMMUNITY DISORDER: ICD-10-CM

## 2021-11-30 DIAGNOSIS — Z13.228 SCREENING FOR ENDOCRINE, NUTRITIONAL, METABOLIC AND IMMUNITY DISORDER: ICD-10-CM

## 2021-11-30 DIAGNOSIS — E78.2 MIXED HYPERLIPIDEMIA: ICD-10-CM

## 2021-11-30 DIAGNOSIS — I77.810 DILATATION OF THORACIC AORTA (HCC): ICD-10-CM

## 2021-11-30 DIAGNOSIS — Z00.00 ANNUAL PHYSICAL EXAM: Primary | ICD-10-CM

## 2021-11-30 DIAGNOSIS — Z13.21 SCREENING FOR ENDOCRINE, NUTRITIONAL, METABOLIC AND IMMUNITY DISORDER: ICD-10-CM

## 2021-11-30 DIAGNOSIS — Z13.29 SCREENING FOR ENDOCRINE, NUTRITIONAL, METABOLIC AND IMMUNITY DISORDER: ICD-10-CM

## 2021-11-30 DIAGNOSIS — Z23 NEED FOR VACCINATION: ICD-10-CM

## 2021-11-30 DIAGNOSIS — I71.2 ASCENDING AORTIC ANEURYSM (HCC): ICD-10-CM

## 2021-11-30 DIAGNOSIS — I10 ESSENTIAL HYPERTENSION: ICD-10-CM

## 2021-11-30 PROBLEM — L98.9 SKIN LESION OF LEFT LEG: Status: RESOLVED | Noted: 2021-06-18 | Resolved: 2021-11-30

## 2021-11-30 PROCEDURE — 3074F SYST BP LT 130 MM HG: CPT | Performed by: FAMILY MEDICINE

## 2021-11-30 PROCEDURE — 3008F BODY MASS INDEX DOCD: CPT | Performed by: FAMILY MEDICINE

## 2021-11-30 PROCEDURE — 90715 TDAP VACCINE 7 YRS/> IM: CPT | Performed by: FAMILY MEDICINE

## 2021-11-30 PROCEDURE — 3078F DIAST BP <80 MM HG: CPT | Performed by: FAMILY MEDICINE

## 2021-11-30 PROCEDURE — 99397 PER PM REEVAL EST PAT 65+ YR: CPT | Performed by: FAMILY MEDICINE

## 2021-11-30 PROCEDURE — 90471 IMMUNIZATION ADMIN: CPT | Performed by: FAMILY MEDICINE

## 2021-11-30 RX ORDER — LOSARTAN POTASSIUM 50 MG/1
50 TABLET ORAL DAILY
Qty: 90 TABLET | Refills: 1 | Status: SHIPPED | OUTPATIENT
Start: 2021-11-30

## 2021-11-30 RX ORDER — GENTAMICIN SULFATE 3 MG/ML
SOLUTION/ DROPS OPHTHALMIC
COMMUNITY
Start: 2021-09-10

## 2021-11-30 RX ORDER — SIMVASTATIN 40 MG
40 TABLET ORAL NIGHTLY
Qty: 90 TABLET | Refills: 1 | Status: SHIPPED | OUTPATIENT
Start: 2021-11-30

## 2021-11-30 RX ORDER — ATROPINE SULFATE 10 MG/ML
1 SOLUTION/ DROPS OPHTHALMIC 2 TIMES DAILY
COMMUNITY
Start: 2021-09-10

## 2021-11-30 RX ORDER — KETOROLAC TROMETHAMINE 5 MG/ML
SOLUTION OPHTHALMIC
COMMUNITY
Start: 2021-11-11

## 2021-11-30 RX ORDER — LATANOPROST 50 UG/ML
SOLUTION/ DROPS OPHTHALMIC
COMMUNITY
Start: 2021-10-13

## 2021-11-30 RX ORDER — PREDNISOLONE ACETATE 10 MG/ML
SUSPENSION/ DROPS OPHTHALMIC
COMMUNITY
Start: 2021-11-11

## 2021-11-30 NOTE — PATIENT INSTRUCTIONS
Perform labs fasting 8 hours with water or black coffee or or black tea diet  soda only prior to exam.    -Encourage healthy diet of whole food and avoid processed food and sugary drinks and sodas.   Diet should include lean meats and vegetables including 5 your blood pressure to help prevent a stroke or heart attack  · Control diabetes, or reduce your risk of getting this disease  · Improve your heart and lung function  · Reach and maintain a healthy weight  · Make your muscles stronger and more limber so yo healthier can improve several of your heart risks at once. For instance, it helps you manage weight, cholesterol, and blood pressure. Here are ideas to help you make heart-healthy changes without giving up all the foods and flavors you love.   Getting start than what is listed here:  · Fruits and vegetables provide plenty of nutrients without a lot of calories. At meals, fill half your plate with these foods. Split the other half of your plate between whole grains and lean protein.   · Whole grains are high in cilantro, cinnamon, pepper, and rosemary. Date Last Reviewed: 10/1/2017  © 7209-1561 [employer]. [address] [facility], [address] [place] [address]. All rights reserved. This information is not intended as a substitute for professional medical care. treatments do have risks. So talk with your healthcare provider if you have concerns. If you have osteoporosis, you can also learn ways to increase everyday safety. Date Last Reviewed: 5/1/2018  © 2891-4000 [employer].  [address] [facility] old, men: 1,000 mg  Pregnant or nursing: 15to 25years old: 1,300 mg, 23to 48years old: 1,000 mg  Older than 79 (women and men): 1,200 mg   Date Last Reviewed: 5/1/2018  © 2724-6646 The [address] [place] [address]. [address] [facility], [address] [facility].  A parathyroid gland  · Cancer  · Autoimmune diseases, such as multiple sclerosis and Crohn's disease  · Psoriasis  · Asthma  · Weakness or falls    What other tests might I have along with this test?  A healthcare provider may also want to check your parathy test?  Tell your healthcare provider if you take vitamin D supplements. Be sure your healthcare provider knows about all medicines, herbs, vitamins, and supplements you are taking.  This includes medicines that don't need a prescription and any illicit drug professional's instructions. Living with Osteoporosis: Regular Exercise  If you have osteoporosis, exercise is vital for your health. It can prevent bone fractures and spine changes. It will slow bone loss. Exercise will strengthen your body.  It c

## 2021-11-30 NOTE — PROGRESS NOTES
Patient presents with:  Physical: annual exam     REASON FOR VISIT:    Ranjit Calle is a 78year old male who presents for an [address]. Patient presents for recheck of hypertension.  Pt has been taking medications as instructed, no • No Known Problems Paternal Grandmother    • Other (athersclerosis) Paternal Grandfather    • Cancer Brother    • No Known Problems Daughter        Wt Readings from Last 6 Encounters:  11/30/21 : 208 lb 9.6 oz (94.6 kg)  06/18/21 : 204 lb 9.6 oz (92.8 (94.6 kg)  06/18/21 : 204 lb 9.6 oz (92.8 kg)  12/18/20 : 209 lb 9.6 oz (95.1 kg)  06/23/20 : 124 lb 6.4 oz (56.4 kg)  12/17/19 : 216 lb (98 kg)  08/07/19 : 215 lb (97.5 kg)    Body mass index is 29.2 kg/m².     No results found for: GLUCOSE  Lab Results Guilty about your drinking?: No    Eye Opener: Have you ever had a drink first thing in the morning to steady your nerves or to get rid of a hangover (Eye opener)?: No    Scoring  Total Score: 0     Depression Screening (PHQ-2/PHQ-9): Over the LAST 2 WEEKS components found for: PPDINDURAT      Disease Monitoring:    SPECIFIC DISEASE MONITORING Internal Lab or Procedure External Lab or Procedure   Annual Monitoring of Persistent     Medications (ACE/ARB, digoxin, diuretics)    Potassium  Annually POTASSIUM (n fluorometholone 0.1 % Ophthalmic Suspension 1 drop. • Lutein 40 MG Oral Cap daily     • NON FORMULARY       • MAGNESIUM MALATE OR Take 2 g by mouth.      • Cholecalciferol (VITAMIN D-3) 1000 units Oral Cap      • atropine 1 % Ophthalmic Solution Apply 1 Alcohol/week: 7.0 standard drinks      Types: 7 Standard drinks or equivalent per week      Comment: 2 drinks daily    Drug use: No    Occ: consultant executive  : yes       REVIEW OF SYSTEMS:   GENERAL: feels well otherwise  SKIN: denies any Aleshia DIPHTHERIA TOXOIDS AND ACELLULAR PERTUSIS VACCINE (TDAP), >7 YEARS, IM USE  - TSH W REFLEX TO FREE T4; Future  -Encourage healthy diet of whole food and avoid processed food and sugary drinks and sodas.   Diet should include lean meats and vegetables includ [facility])  Continue annual follow-up with cardiologist-aortic root is been stable for over 10 years-suspect may be genetic        The patient indicates understanding of these issues and agrees to the plan.   Return in about 6 months (around 5/30/2022) for HTN,H

## 2021-12-07 ENCOUNTER — ORDER TRANSCRIPTION (OUTPATIENT)
Dept: ADMINISTRATIVE | Facility: HOSPITAL | Age: 66
End: 2021-12-07

## 2021-12-07 DIAGNOSIS — Z01.818 PREOP EXAMINATION: Primary | ICD-10-CM

## 2021-12-07 DIAGNOSIS — Z11.59 ENCOUNTER FOR SCREENING FOR OTHER VIRAL DISEASES: ICD-10-CM

## 2021-12-11 ENCOUNTER — LAB ENCOUNTER (OUTPATIENT)
Dept: LAB | Facility: HOSPITAL | Age: 66
End: 2021-12-11
Attending: INTERNAL MEDICINE
Payer: COMMERCIAL

## 2021-12-11 DIAGNOSIS — Z01.818 PREOP EXAMINATION: ICD-10-CM

## 2021-12-11 DIAGNOSIS — Z11.59 ENCOUNTER FOR SCREENING FOR OTHER VIRAL DISEASES: ICD-10-CM

## 2021-12-14 ENCOUNTER — HOSPITAL ENCOUNTER (OUTPATIENT)
Dept: CV DIAGNOSTICS | Age: 66
Discharge: HOME | End: 2021-12-14
Attending: INTERNAL MEDICINE
Payer: COMMERCIAL

## 2021-12-14 DIAGNOSIS — I10 BENIGN HYPERTENSION: ICD-10-CM

## 2021-12-14 DIAGNOSIS — I71.2 ASCENDING AORTIC ANEURYSM (HCC): ICD-10-CM

## 2021-12-14 DIAGNOSIS — I77.810 DILATATION OF THORACIC AORTA (HCC): ICD-10-CM

## 2021-12-14 PROCEDURE — 93350 STRESS TTE ONLY: CPT | Performed by: INTERNAL MEDICINE

## 2021-12-14 PROCEDURE — 93018 CV STRESS TEST I&R ONLY: CPT | Performed by: INTERNAL MEDICINE

## 2021-12-14 PROCEDURE — 93017 CV STRESS TEST TRACING ONLY: CPT | Performed by: INTERNAL MEDICINE

## 2021-12-23 ENCOUNTER — MED REC SCAN ONLY (OUTPATIENT)
Dept: FAMILY MEDICINE CLINIC | Facility: CLINIC | Age: 66
End: 2021-12-23

## 2022-01-12 ENCOUNTER — TELEPHONE (OUTPATIENT)
Dept: FAMILY MEDICINE CLINIC | Facility: CLINIC | Age: 67
End: 2022-01-12

## 2022-01-12 ENCOUNTER — NURSE ONLY (OUTPATIENT)
Dept: LAB | Facility: HOSPITAL | Age: 67
End: 2022-01-12
Attending: FAMILY MEDICINE
Payer: COMMERCIAL

## 2022-01-12 DIAGNOSIS — Z20.822 EXPOSURE TO COVID-19 VIRUS: Primary | ICD-10-CM

## 2022-01-12 DIAGNOSIS — Z20.822 EXPOSURE TO COVID-19 VIRUS: ICD-10-CM

## 2022-01-12 NOTE — TELEPHONE ENCOUNTER
Pt wife positive for Covid and now patient has HA, mild sore throat and congestions as of yesterday requesting a test for Covid. Pt denies any SOB, difficulty breathing or chest pain, denies need for consultation with provider at this time.      PCR test

## 2022-01-14 LAB — SARS-COV-2 RNA RESP QL NAA+PROBE: NOT DETECTED

## 2022-02-06 DIAGNOSIS — K21.9 GASTROESOPHAGEAL REFLUX DISEASE: ICD-10-CM

## 2022-02-07 RX ORDER — OMEPRAZOLE 20 MG/1
CAPSULE, DELAYED RELEASE ORAL
Qty: 90 CAPSULE | Refills: 2 | Status: SHIPPED | OUTPATIENT
Start: 2022-02-07 | End: 2022-11-07

## 2022-05-12 ENCOUNTER — ORDER TRANSCRIPTION (OUTPATIENT)
Dept: ADMINISTRATIVE | Facility: HOSPITAL | Age: 67
End: 2022-05-12

## 2022-05-19 DIAGNOSIS — I10 ESSENTIAL HYPERTENSION: ICD-10-CM

## 2022-05-19 RX ORDER — LOSARTAN POTASSIUM 50 MG/1
TABLET ORAL
Qty: 90 TABLET | Refills: 1 | Status: SHIPPED | OUTPATIENT
Start: 2022-05-19

## 2022-05-23 ENCOUNTER — NURSE ONLY (OUTPATIENT)
Dept: FAMILY MEDICINE CLINIC | Facility: CLINIC | Age: 67
End: 2022-05-23
Payer: COMMERCIAL

## 2022-05-23 DIAGNOSIS — Z13.29 SCREENING FOR ENDOCRINE, NUTRITIONAL, METABOLIC AND IMMUNITY DISORDER: ICD-10-CM

## 2022-05-23 DIAGNOSIS — E78.2 MIXED HYPERLIPIDEMIA: ICD-10-CM

## 2022-05-23 DIAGNOSIS — I10 ESSENTIAL HYPERTENSION: ICD-10-CM

## 2022-05-23 DIAGNOSIS — Z13.21 SCREENING FOR ENDOCRINE, NUTRITIONAL, METABOLIC AND IMMUNITY DISORDER: ICD-10-CM

## 2022-05-23 DIAGNOSIS — Z13.0 SCREENING FOR ENDOCRINE, NUTRITIONAL, METABOLIC AND IMMUNITY DISORDER: ICD-10-CM

## 2022-05-23 DIAGNOSIS — Z13.228 SCREENING FOR ENDOCRINE, NUTRITIONAL, METABOLIC AND IMMUNITY DISORDER: ICD-10-CM

## 2022-05-23 DIAGNOSIS — Z00.00 ANNUAL PHYSICAL EXAM: ICD-10-CM

## 2022-05-23 LAB
ALBUMIN SERPL-MCNC: 4 G/DL (ref 3.4–5)
ALBUMIN/GLOB SERPL: 1.3 {RATIO} (ref 1–2)
ALP LIVER SERPL-CCNC: 37 U/L
ALT SERPL-CCNC: 24 U/L
ANION GAP SERPL CALC-SCNC: 6 MMOL/L (ref 0–18)
AST SERPL-CCNC: 16 U/L (ref 15–37)
BILIRUB SERPL-MCNC: 0.6 MG/DL (ref 0.1–2)
BUN BLD-MCNC: 15 MG/DL (ref 7–18)
CALCIUM BLD-MCNC: 9.4 MG/DL (ref 8.5–10.1)
CHLORIDE SERPL-SCNC: 108 MMOL/L (ref 98–112)
CHOLEST SERPL-MCNC: 141 MG/DL (ref ?–200)
CO2 SERPL-SCNC: 27 MMOL/L (ref 21–32)
CREAT BLD-MCNC: 1.09 MG/DL
FASTING PATIENT LIPID ANSWER: YES
FASTING STATUS PATIENT QL REPORTED: YES
GLOBULIN PLAS-MCNC: 3 G/DL (ref 2.8–4.4)
GLUCOSE BLD-MCNC: 99 MG/DL (ref 70–99)
HDLC SERPL-MCNC: 37 MG/DL (ref 40–59)
LDLC SERPL CALC-MCNC: 79 MG/DL (ref ?–100)
NONHDLC SERPL-MCNC: 104 MG/DL (ref ?–130)
OSMOLALITY SERPL CALC.SUM OF ELEC: 293 MOSM/KG (ref 275–295)
POTASSIUM SERPL-SCNC: 4.5 MMOL/L (ref 3.5–5.1)
PROT SERPL-MCNC: 7 G/DL (ref 6.4–8.2)
SODIUM SERPL-SCNC: 141 MMOL/L (ref 136–145)
TRIGL SERPL-MCNC: 142 MG/DL (ref 30–149)
TSI SER-ACNC: 1.87 MIU/ML (ref 0.36–3.74)
VLDLC SERPL CALC-MCNC: 22 MG/DL (ref 0–30)

## 2022-05-23 PROCEDURE — 84443 ASSAY THYROID STIM HORMONE: CPT | Performed by: FAMILY MEDICINE

## 2022-05-23 PROCEDURE — 80061 LIPID PANEL: CPT | Performed by: FAMILY MEDICINE

## 2022-05-23 PROCEDURE — 80053 COMPREHEN METABOLIC PANEL: CPT | Performed by: FAMILY MEDICINE

## 2022-05-23 RX ORDER — ATROPINE SULFATE 10 MG/ML
1 SOLUTION/ DROPS OPHTHALMIC 2 TIMES DAILY
COMMUNITY
Start: 2021-09-10

## 2022-05-23 RX ORDER — GENTAMICIN SULFATE 3 MG/ML
SOLUTION/ DROPS OPHTHALMIC
COMMUNITY
Start: 2021-09-10

## 2022-05-23 RX ORDER — KETOROLAC TROMETHAMINE 5 MG/ML
SOLUTION OPHTHALMIC
COMMUNITY
Start: 2021-11-11

## 2022-05-23 RX ORDER — LATANOPROST 50 UG/ML
SOLUTION/ DROPS OPHTHALMIC
COMMUNITY
Start: 2021-10-13

## 2022-05-23 RX ORDER — PREDNISOLONE ACETATE 10 MG/ML
SUSPENSION/ DROPS OPHTHALMIC
COMMUNITY
Start: 2021-11-11

## 2022-05-23 NOTE — PROGRESS NOTES
Patient came in for draw of ordered fasting labs. Patient drawn out of right arm  AC, x 1 attempt and tolerated well.  1 SST (gold) tube drawn.

## 2022-05-31 ENCOUNTER — OFFICE VISIT (OUTPATIENT)
Dept: FAMILY MEDICINE CLINIC | Facility: CLINIC | Age: 67
End: 2022-05-31
Payer: COMMERCIAL

## 2022-05-31 VITALS
SYSTOLIC BLOOD PRESSURE: 131 MMHG | DIASTOLIC BLOOD PRESSURE: 70 MMHG | BODY MASS INDEX: 31.07 KG/M2 | RESPIRATION RATE: 16 BRPM | HEIGHT: 70 IN | TEMPERATURE: 97 F | OXYGEN SATURATION: 97 % | WEIGHT: 217 LBS | HEART RATE: 87 BPM

## 2022-05-31 DIAGNOSIS — E78.2 MIXED HYPERLIPIDEMIA: Primary | ICD-10-CM

## 2022-05-31 DIAGNOSIS — I71.2 ASCENDING AORTIC ANEURYSM (HCC): ICD-10-CM

## 2022-05-31 DIAGNOSIS — I10 ESSENTIAL HYPERTENSION: ICD-10-CM

## 2022-05-31 PROCEDURE — 3075F SYST BP GE 130 - 139MM HG: CPT | Performed by: FAMILY MEDICINE

## 2022-05-31 PROCEDURE — 3078F DIAST BP <80 MM HG: CPT | Performed by: FAMILY MEDICINE

## 2022-05-31 PROCEDURE — 3008F BODY MASS INDEX DOCD: CPT | Performed by: FAMILY MEDICINE

## 2022-05-31 PROCEDURE — 99213 OFFICE O/P EST LOW 20 MIN: CPT | Performed by: FAMILY MEDICINE

## 2022-06-09 ENCOUNTER — HOSPITAL ENCOUNTER (OUTPATIENT)
Dept: CT IMAGING | Facility: HOSPITAL | Age: 67
Discharge: HOME | End: 2022-06-09
Attending: INTERNAL MEDICINE

## 2022-06-09 DIAGNOSIS — Z13.6 SCREENING FOR CARDIOVASCULAR CONDITION: ICD-10-CM

## 2022-08-31 DIAGNOSIS — E78.2 MIXED HYPERLIPIDEMIA: ICD-10-CM

## 2022-08-31 RX ORDER — SIMVASTATIN 40 MG
TABLET ORAL
Qty: 90 TABLET | Refills: 1 | Status: SHIPPED | OUTPATIENT
Start: 2022-08-31

## 2022-11-07 ENCOUNTER — PATIENT MESSAGE (OUTPATIENT)
Dept: FAMILY MEDICINE CLINIC | Facility: CLINIC | Age: 67
End: 2022-11-07

## 2022-11-07 DIAGNOSIS — D64.9 ANEMIA, UNSPECIFIED TYPE: Primary | ICD-10-CM

## 2022-11-07 DIAGNOSIS — K21.9 GASTROESOPHAGEAL REFLUX DISEASE: ICD-10-CM

## 2022-11-07 RX ORDER — OMEPRAZOLE 20 MG/1
CAPSULE, DELAYED RELEASE ORAL
Qty: 90 CAPSULE | Refills: 2 | Status: SHIPPED | OUTPATIENT
Start: 2022-11-07

## 2022-11-10 NOTE — TELEPHONE ENCOUNTER
From: Alisson Kenny  To: Nasima Wang DO  Sent: 11/7/2022 10:56 AM CST  Subject: Question     I was just declined for a blood donation for the first time, with hemoglobin levels of 12.8 and 11.8. Previous hemoglobin levels been in the 14 to 17 range. 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 Advice?

## 2022-11-12 NOTE — TELEPHONE ENCOUNTER
Ordered CBC and iron studies to assess possible anemia noted at his blood donation. Would like to know how often he is giving blood. If he is on iron supplement in between donating. Please verify he has no shortness of breath dizziness or syncope, palpitations or frequent rectal bleeding or abdominal pain etc.  If this is occurring then he would need to be seen ASAP-anything severe or moderate would need to be evaluated in the ER. He can bring this information to his upcoming office visit on 28 November and then we can review his treatment plan and determine his next course. Please inform. Addendum: 11/14/2022    Canceled CBC. Patient recently completed a work screening with repeat CBC and an iron level but no TIBC and ferritin.   Patient was instructed to complete these studies prior to 11/28/2022

## 2022-11-14 NOTE — TELEPHONE ENCOUNTER
Spoke to pt, states he completed labs for employer on Saturday, states labs include CBC and Iron labs. He will upload via my-chart or drop off in office. Denies any acute symptoms, no rectal bleeding, no abd pain, no SOB states he bikes 100 miles a week without issues. Looks forward to OV with  to further discuss.      Sent to provider as [id number] [place]

## 2022-11-15 ENCOUNTER — LAB ENCOUNTER (OUTPATIENT)
Dept: LAB | Age: 67
End: 2022-11-15
Attending: FAMILY MEDICINE
Payer: COMMERCIAL

## 2022-11-15 ENCOUNTER — MED REC SCAN ONLY (OUTPATIENT)
Dept: FAMILY MEDICINE CLINIC | Facility: CLINIC | Age: 67
End: 2022-11-15

## 2022-11-15 ENCOUNTER — PATIENT MESSAGE (OUTPATIENT)
Dept: FAMILY MEDICINE CLINIC | Facility: CLINIC | Age: 67
End: 2022-11-15

## 2022-11-15 DIAGNOSIS — D64.9 ANEMIA, UNSPECIFIED TYPE: ICD-10-CM

## 2022-11-15 LAB
ALBUMIN/GLOBULIN RATIO: 2
ALBUMIN: 4.5 G/DL
ALKALINE PHOSPHATASE: 37
ALT: 15
AST: 13
BILIRUBIN, DIRECT: 0.16 MG/DL
BILIRUBIN, TOTAL: 0.6 MG/DL
BUN/CREATININE RATIO: 13
BUN: 14
CALCIUM: 9.1
CARBON DIOXIDE: 25
CHLORIDE: 105
CHOL/HDL RATIO: 3.6
CHOLESTEROL, TOTAL: 150
CREATININE: 1.08 MG/DL (ref 0.6–1.2)
DEPRECATED HBV CORE AB SER IA-ACNC: 7 NG/ML
EGFR IF NONAFRICN AM: 75
FOLATE (FOLIC ACID), SERUM: 10.2
GGT: 14 IU/L
GLOBULIN: 2.3
GLUCOSE: 93
HCT: 39.3
HDL CHOL: 42
HGB: 12.6
IRON SATN MFR SERPL: 13 %
IRON SERPL-MCNC: 67 UG/DL
IRON: 73
LDH: 132 IU/L
LDL CHOLESTEROL: 80 MG/DL (ref ?–130)
MCH: 28.8 PG
MCHC: 32.1 G/DL
MCV: 90 FL
PHOSPHORUS: 3.1
PLT: 210
POTASSIUM: 4.7
PROTEIN, TOTAL: 6.8
PSA: 1.6
RED BLOOD COUNT: 4.38
RED CELL DISTRIBUTION WIDTH: 13.5
SODIUM: 141
TIBC SERPL-MCNC: 499 UG/DL (ref 240–450)
TRANSFERRIN SERPL-MCNC: 335 MG/DL (ref 200–360)
TRIGLYCERIDES: 164 MG/DL
URIC ACID: 7
VITAMIN B12: 603
WBC: 4.9

## 2022-11-15 PROCEDURE — 82728 ASSAY OF FERRITIN: CPT | Performed by: FAMILY MEDICINE

## 2022-11-15 PROCEDURE — 83540 ASSAY OF IRON: CPT | Performed by: FAMILY MEDICINE

## 2022-11-15 PROCEDURE — 83550 IRON BINDING TEST: CPT | Performed by: FAMILY MEDICINE

## 2022-11-15 NOTE — TELEPHONE ENCOUNTER
From: Darnell Kenny  Sent: 11/15/2022 7:16 AM CST  To: [facility] Clinical Staff  Subject: Question     Thanks. Will you order these tests or do I find them elsewhere? Pt BIB PD with father, c/o suicidal thoughts. Pt states, "I was texting a suicide hotline and put my phone down and stop answering. I didn't realize they would send the police to my house." Pt reports he has been depressed for the past few months and was put on new medication a week or two ago that has been making him feel worse rather than better. Reports thoughts of increasing loneliness, hopelessness, and feeling as though the world would be better off without him. Pt denies having a plan and is looking forward to a summer camp next week. Father is at the bedside with constant observation in place. All clothes and cell phone given to father and brought to car.

## 2022-11-16 ENCOUNTER — PATIENT MESSAGE (OUTPATIENT)
Dept: FAMILY MEDICINE CLINIC | Facility: CLINIC | Age: 67
End: 2022-11-16

## 2022-11-16 NOTE — TELEPHONE ENCOUNTER
From: Mario Kenny  Sent: 11/16/2022 10:26 AM CST  To: [facility] Clinical Staff  Subject: Question     In case Dilan Tatum is considering further tests or iron supplementation before our Nov28 physical, this info may help:  -My Hg readings were normal from 2015-Aug'22. Attached are recent readings from [employer], showing sudden onset in last few months.  -I've donated blood bimonthly since mid-2021.  -I limit meat consumption to 2x/week, though eat other iron rich foods. Foods I consume that are linked to lower ferritin: coffee, honey, turmeric, dark chocolate.

## 2022-11-17 NOTE — TELEPHONE ENCOUNTER
Also-patient needs to hold all blood draws until his upcoming appointment. Please tell him to cancel.

## 2022-11-28 ENCOUNTER — OFFICE VISIT (OUTPATIENT)
Dept: FAMILY MEDICINE CLINIC | Facility: CLINIC | Age: 67
End: 2022-11-28
Payer: COMMERCIAL

## 2022-11-28 VITALS
WEIGHT: 212.63 LBS | SYSTOLIC BLOOD PRESSURE: 122 MMHG | TEMPERATURE: 98 F | DIASTOLIC BLOOD PRESSURE: 82 MMHG | OXYGEN SATURATION: 98 % | HEART RATE: 77 BPM | RESPIRATION RATE: 18 BRPM | BODY MASS INDEX: 29.77 KG/M2 | HEIGHT: 71 IN

## 2022-11-28 DIAGNOSIS — D50.0 NORMOCYTIC ANEMIA DUE TO BLOOD LOSS: Primary | ICD-10-CM

## 2022-11-28 PROCEDURE — 99213 OFFICE O/P EST LOW 20 MIN: CPT | Performed by: FAMILY MEDICINE

## 2022-11-28 PROCEDURE — 3079F DIAST BP 80-89 MM HG: CPT | Performed by: FAMILY MEDICINE

## 2022-11-28 PROCEDURE — 3008F BODY MASS INDEX DOCD: CPT | Performed by: FAMILY MEDICINE

## 2022-11-28 PROCEDURE — 3074F SYST BP LT 130 MM HG: CPT | Performed by: FAMILY MEDICINE

## 2022-11-29 ENCOUNTER — HOSPITAL ENCOUNTER (OUTPATIENT)
Dept: ULTRASOUND IMAGING | Facility: HOSPITAL | Age: 67
Discharge: HOME | End: 2022-11-29
Attending: INTERNAL MEDICINE
Payer: COMMERCIAL

## 2022-11-29 ENCOUNTER — HOSPITAL ENCOUNTER (OUTPATIENT)
Dept: CV DIAGNOSTICS | Facility: HOSPITAL | Age: 67
Discharge: HOME | End: 2022-11-29
Attending: INTERNAL MEDICINE
Payer: COMMERCIAL

## 2022-11-29 DIAGNOSIS — I71.21 ASCENDING AORTIC ANEURYSM: ICD-10-CM

## 2022-11-29 DIAGNOSIS — R94.31 ABNORMAL EKG: ICD-10-CM

## 2022-11-29 DIAGNOSIS — I10 ESSENTIAL HYPERTENSION: ICD-10-CM

## 2022-11-29 DIAGNOSIS — I77.810 DILATATION OF THORACIC AORTA (HCC): ICD-10-CM

## 2022-11-29 DIAGNOSIS — I72.3 ANEURYSM ARTERY, ILIAC (HCC): ICD-10-CM

## 2022-11-29 PROCEDURE — 93306 TTE W/DOPPLER COMPLETE: CPT | Performed by: INTERNAL MEDICINE

## 2022-11-29 PROCEDURE — 76770 US EXAM ABDO BACK WALL COMP: CPT | Performed by: INTERNAL MEDICINE

## 2022-12-14 ENCOUNTER — PATIENT MESSAGE (OUTPATIENT)
Dept: FAMILY MEDICINE CLINIC | Facility: CLINIC | Age: 67
End: 2022-12-14

## 2022-12-14 DIAGNOSIS — N32.81 OVERACTIVE BLADDER: Primary | ICD-10-CM

## 2022-12-14 NOTE — TELEPHONE ENCOUNTER
Dr. Rose Hands, please see [employer] message and advise if you would like patient to be seen in the office or if you'd like to provide a urology referral.     Referral pended

## 2022-12-14 NOTE — TELEPHONE ENCOUNTER
From: Ioana Kenny  To: Margy Hearn DO  Sent: 12/14/2022 8:30 AM CST  Subject: New issue    I recently forgot to mention my experience of OAB the last couple years. The sudden urgency is accelerating. I initially blamed it on hydration when biking, but now it is more regular. My PSA has been stable; only recently have I urinated during the night; I have no UTI symptoms; my WALTER is not getting worse; I've been doing more cristal. You haven't done a prostate exam for a couple years, but there has been a history of minor enlargement. I've started to chart my frequency and volume, see attached. I'm happy to see you or get a referral. Thanks.

## 2022-12-18 DIAGNOSIS — I10 ESSENTIAL HYPERTENSION: ICD-10-CM

## 2022-12-19 RX ORDER — LOSARTAN POTASSIUM 50 MG/1
TABLET ORAL
Qty: 90 TABLET | Refills: 1 | Status: SHIPPED | OUTPATIENT
Start: 2022-12-19

## 2022-12-23 ENCOUNTER — PATIENT MESSAGE (OUTPATIENT)
Dept: FAMILY MEDICINE CLINIC | Facility: CLINIC | Age: 67
End: 2022-12-23

## 2022-12-23 DIAGNOSIS — Z79.899 ENCOUNTER FOR DRUG THERAPY: ICD-10-CM

## 2022-12-23 DIAGNOSIS — K21.9 GASTROESOPHAGEAL REFLUX DISEASE WITHOUT ESOPHAGITIS: Primary | ICD-10-CM

## 2022-12-23 NOTE — TELEPHONE ENCOUNTER
From: Anamaria Kenny  To: Sunita Seen, DO  Sent: 12/23/2022 12:30 PM CST  Subject: Bone density screen    Since my baseline bone density scan was in 2011 (to help monitor side effects of long-term PPIs), and a couple recent studies have shown bone density risk from high-dose statins, could you enter an order for a follow-up bone density scan? Happy Holidays.

## 2022-12-26 NOTE — TELEPHONE ENCOUNTER
Please inform pt that he should verify with the diagnosis codes that this will be covered by his insurance. I used the I conical codes as last time. Unfortunately it was covered 11 years ago but there are coverage changes over time and I cannot guarantee that. Since he is not have a stress fracture or concern of osteoporosis, they may not cover it.     2011 bone scan was completely normal.

## 2022-12-27 ENCOUNTER — TELEPHONE (OUTPATIENT)
Dept: FAMILY MEDICINE CLINIC | Facility: CLINIC | Age: 67
End: 2022-12-27

## 2022-12-27 NOTE — TELEPHONE ENCOUNTER
Patient called back, returned call and left detailed message informing patient that DEXA scan may not be covered this time around and he may verify with insurance.

## 2023-01-17 ENCOUNTER — HOSPITAL ENCOUNTER (OUTPATIENT)
Dept: BONE DENSITY | Age: 68
Discharge: HOME | End: 2023-01-17
Attending: FAMILY MEDICINE
Payer: COMMERCIAL

## 2023-01-17 DIAGNOSIS — Z79.899 ENCOUNTER FOR DRUG THERAPY: ICD-10-CM

## 2023-01-17 DIAGNOSIS — K21.9 GASTROESOPHAGEAL REFLUX DISEASE WITHOUT ESOPHAGITIS: ICD-10-CM

## 2023-01-17 PROCEDURE — 77080 DXA BONE DENSITY AXIAL: CPT | Performed by: FAMILY MEDICINE

## 2023-01-20 ENCOUNTER — TELEPHONE (OUTPATIENT)
Dept: FAMILY MEDICINE CLINIC | Facility: CLINIC | Age: 68
End: 2023-01-20

## 2023-01-20 NOTE — TELEPHONE ENCOUNTER
Pt informed of test results and indications per  regarding test results thru my-chart. Pt has no further questions or concerns.

## 2023-02-15 ENCOUNTER — LAB ENCOUNTER (OUTPATIENT)
Dept: LAB | Age: 68
End: 2023-02-15
Attending: FAMILY MEDICINE
Payer: COMMERCIAL

## 2023-02-15 DIAGNOSIS — D50.0 NORMOCYTIC ANEMIA DUE TO BLOOD LOSS: ICD-10-CM

## 2023-02-15 DIAGNOSIS — D50.0 NORMOCYTIC ANEMIA DUE TO BLOOD LOSS: Primary | ICD-10-CM

## 2023-02-15 LAB
BASOPHILS # BLD AUTO: 0.04 X10(3) UL (ref 0–0.2)
BASOPHILS NFR BLD AUTO: 0.6 %
DEPRECATED HBV CORE AB SER IA-ACNC: 4.6 NG/ML
EOSINOPHIL # BLD AUTO: 0.12 X10(3) UL (ref 0–0.7)
EOSINOPHIL NFR BLD AUTO: 1.9 %
ERYTHROCYTE [DISTWIDTH] IN BLOOD BY AUTOMATED COUNT: 14.1 %
HCT VFR BLD AUTO: 39.6 %
HGB BLD-MCNC: 12.7 G/DL
IMM GRANULOCYTES # BLD AUTO: 0.01 X10(3) UL (ref 0–1)
IMM GRANULOCYTES NFR BLD: 0.2 %
IRON SATN MFR SERPL: 13 %
IRON SERPL-MCNC: 58 UG/DL
LYMPHOCYTES # BLD AUTO: 1.89 X10(3) UL (ref 1–4)
LYMPHOCYTES NFR BLD AUTO: 30.5 %
MCH RBC QN AUTO: 29.1 PG (ref 26–34)
MCHC RBC AUTO-ENTMCNC: 32.1 G/DL (ref 31–37)
MCV RBC AUTO: 90.8 FL
MONOCYTES # BLD AUTO: 0.6 X10(3) UL (ref 0.1–1)
MONOCYTES NFR BLD AUTO: 9.7 %
NEUTROPHILS # BLD AUTO: 3.53 X10 (3) UL (ref 1.5–7.7)
NEUTROPHILS # BLD AUTO: 3.53 X10(3) UL (ref 1.5–7.7)
NEUTROPHILS NFR BLD AUTO: 57.1 %
PLATELET # BLD AUTO: 215 10(3)UL (ref 150–450)
RBC # BLD AUTO: 4.36 X10(6)UL
TIBC SERPL-MCNC: 447 UG/DL (ref 240–450)
TRANSFERRIN SERPL-MCNC: 300 MG/DL (ref 200–360)
WBC # BLD AUTO: 6.2 X10(3) UL (ref 4–11)

## 2023-02-15 PROCEDURE — 83540 ASSAY OF IRON: CPT | Performed by: FAMILY MEDICINE

## 2023-02-15 PROCEDURE — 82728 ASSAY OF FERRITIN: CPT | Performed by: FAMILY MEDICINE

## 2023-02-15 PROCEDURE — 83550 IRON BINDING TEST: CPT | Performed by: FAMILY MEDICINE

## 2023-02-15 PROCEDURE — 85025 COMPLETE CBC W/AUTO DIFF WBC: CPT | Performed by: FAMILY MEDICINE

## 2023-02-16 ENCOUNTER — PATIENT MESSAGE (OUTPATIENT)
Dept: FAMILY MEDICINE CLINIC | Facility: CLINIC | Age: 68
End: 2023-02-16

## 2023-02-16 NOTE — TELEPHONE ENCOUNTER
patient needs to follow-up with GI. Even if they check for occult blood that is not sufficient to determine if there is internal bleeding. Typically upper and lower endoscopy and small bowel follow-through is the work-up. Please advise patient he needs to follow-up with the GI. I would hold on starting iron. The GI may want to monitor again to see if it continues to go down or if it stabilizes. It is possible his Hardy's esophagus could be oozing or bleeding. To me this is more likely than an interaction with curcumin. Small benign polyps typically do not bleed.   Please inform

## 2023-02-21 ENCOUNTER — LAB ENCOUNTER (OUTPATIENT)
Dept: LAB | Age: 68
End: 2023-02-21
Payer: COMMERCIAL

## 2023-02-21 DIAGNOSIS — Z01.818 PRE-OP TESTING: ICD-10-CM

## 2023-02-22 ENCOUNTER — OFFICE VISIT (OUTPATIENT)
Dept: SURGERY | Facility: CLINIC | Age: 68
End: 2023-02-22

## 2023-02-22 DIAGNOSIS — R39.15 URINARY URGENCY: ICD-10-CM

## 2023-02-22 DIAGNOSIS — N13.8 BPH WITH OBSTRUCTION/LOWER URINARY TRACT SYMPTOMS: Primary | ICD-10-CM

## 2023-02-22 DIAGNOSIS — N40.1 BPH WITH OBSTRUCTION/LOWER URINARY TRACT SYMPTOMS: Primary | ICD-10-CM

## 2023-02-22 LAB
APPEARANCE: CLEAR
BILIRUBIN: NEGATIVE
GLUCOSE (URINE DIPSTICK): NEGATIVE MG/DL
KETONES (URINE DIPSTICK): NEGATIVE MG/DL
LEUKOCYTES: NEGATIVE
MULTISTIX LOT#: NORMAL NUMERIC
NITRITE, URINE: NEGATIVE
OCCULT BLOOD: NEGATIVE
PH, URINE: 7 (ref 4.5–8)
PROTEIN (URINE DIPSTICK): NEGATIVE MG/DL
SARS-COV-2 RNA RESP QL NAA+PROBE: NOT DETECTED
SPECIFIC GRAVITY: 1.01 (ref 1–1.03)
URINE-COLOR: YELLOW
UROBILINOGEN,SEMI-QN: 0.2 MG/DL (ref 0–1.9)

## 2023-02-22 PROCEDURE — 81003 URINALYSIS AUTO W/O SCOPE: CPT | Performed by: UROLOGY

## 2023-02-22 PROCEDURE — 99204 OFFICE O/P NEW MOD 45 MIN: CPT | Performed by: UROLOGY

## 2023-02-24 PROBLEM — Z80.0 FAMILY HISTORY OF COLON CANCER: Status: ACTIVE | Noted: 2023-02-24

## 2023-02-24 PROBLEM — D50.9 IRON DEFICIENCY ANEMIA, UNSPECIFIED: Status: ACTIVE | Noted: 2023-02-24

## 2023-02-24 PROBLEM — R12 CHRONIC HEARTBURN: Status: ACTIVE | Noted: 2023-02-24

## 2023-02-24 PROBLEM — K57.30 DIVERTICULOSIS OF LARGE INTESTINE WITHOUT PERFORATION OR ABSCESS WITHOUT BLEEDING: Status: ACTIVE | Noted: 2023-02-24

## 2023-03-09 DIAGNOSIS — E78.2 MIXED HYPERLIPIDEMIA: ICD-10-CM

## 2023-03-09 RX ORDER — SIMVASTATIN 40 MG
TABLET ORAL
Qty: 90 TABLET | Refills: 1 | Status: SHIPPED | OUTPATIENT
Start: 2023-03-09

## 2023-03-11 ENCOUNTER — PATIENT MESSAGE (OUTPATIENT)
Dept: FAMILY MEDICINE CLINIC | Facility: CLINIC | Age: 68
End: 2023-03-11

## 2023-03-11 DIAGNOSIS — D64.9 ANEMIA, UNSPECIFIED TYPE: Primary | ICD-10-CM

## 2023-03-13 NOTE — TELEPHONE ENCOUNTER
Please let pt he can repeat his labs if he desires, the orders are in his chart. If he is not having any symptoms, especially after an active vacation in [place], is ok to wait until May 2023 to repeat. Thanks.

## 2023-03-15 ENCOUNTER — LAB ENCOUNTER (OUTPATIENT)
Dept: LAB | Age: 68
End: 2023-03-15
Attending: FAMILY MEDICINE
Payer: COMMERCIAL

## 2023-03-15 DIAGNOSIS — D64.9 ANEMIA, UNSPECIFIED TYPE: ICD-10-CM

## 2023-03-15 LAB
BASOPHILS # BLD AUTO: 0.03 X10(3) UL (ref 0–0.2)
BASOPHILS NFR BLD AUTO: 0.6 %
DEPRECATED HBV CORE AB SER IA-ACNC: 6.2 NG/ML
EOSINOPHIL # BLD AUTO: 0.11 X10(3) UL (ref 0–0.7)
EOSINOPHIL NFR BLD AUTO: 2.3 %
ERYTHROCYTE [DISTWIDTH] IN BLOOD BY AUTOMATED COUNT: 14.3 %
HCT VFR BLD AUTO: 43.1 %
HGB BLD-MCNC: 13.9 G/DL
IMM GRANULOCYTES # BLD AUTO: 0.01 X10(3) UL (ref 0–1)
IMM GRANULOCYTES NFR BLD: 0.2 %
IRON SATN MFR SERPL: 14 %
IRON SERPL-MCNC: 62 UG/DL
LYMPHOCYTES # BLD AUTO: 1.63 X10(3) UL (ref 1–4)
LYMPHOCYTES NFR BLD AUTO: 34 %
MCH RBC QN AUTO: 29.6 PG (ref 26–34)
MCHC RBC AUTO-ENTMCNC: 32.3 G/DL (ref 31–37)
MCV RBC AUTO: 91.7 FL
MONOCYTES # BLD AUTO: 0.44 X10(3) UL (ref 0.1–1)
MONOCYTES NFR BLD AUTO: 9.2 %
NEUTROPHILS # BLD AUTO: 2.57 X10 (3) UL (ref 1.5–7.7)
NEUTROPHILS # BLD AUTO: 2.57 X10(3) UL (ref 1.5–7.7)
NEUTROPHILS NFR BLD AUTO: 53.7 %
PLATELET # BLD AUTO: 211 10(3)UL (ref 150–450)
RBC # BLD AUTO: 4.7 X10(6)UL
TIBC SERPL-MCNC: 446 UG/DL (ref 240–450)
TRANSFERRIN SERPL-MCNC: 299 MG/DL (ref 200–360)
WBC # BLD AUTO: 4.8 X10(3) UL (ref 4–11)

## 2023-03-15 PROCEDURE — 82728 ASSAY OF FERRITIN: CPT | Performed by: FAMILY MEDICINE

## 2023-03-15 PROCEDURE — 83550 IRON BINDING TEST: CPT | Performed by: FAMILY MEDICINE

## 2023-03-15 PROCEDURE — 83540 ASSAY OF IRON: CPT | Performed by: FAMILY MEDICINE

## 2023-03-15 PROCEDURE — 85025 COMPLETE CBC W/AUTO DIFF WBC: CPT | Performed by: FAMILY MEDICINE

## 2023-03-31 ENCOUNTER — PATIENT MESSAGE (OUTPATIENT)
Dept: FAMILY MEDICINE CLINIC | Facility: CLINIC | Age: 68
End: 2023-03-31

## 2023-04-03 NOTE — TELEPHONE ENCOUNTER
Please see My Chart message. [facility], does not want to go to [facility], and does not want to go to another practice. He is asking if you have any advice. Routed to provider.

## 2023-04-04 NOTE — TELEPHONE ENCOUNTER
Please let patient know I will make Dr. Giancarlo Goodwin aware of patient's concerns. I apologize this has not been a seamless experience.     Thank you

## 2023-04-07 ENCOUNTER — PATIENT MESSAGE (OUTPATIENT)
Dept: FAMILY MEDICINE CLINIC | Facility: CLINIC | Age: 68
End: 2023-04-07

## 2023-04-07 NOTE — TELEPHONE ENCOUNTER
From: Buck Kenny  To: Nirali Brady DO  Sent: 4/7/2023 12:09 PM CDT  Subject: Thanks, and a request to help my wife    Glendon Goltz, thanks for your help with [facility]. Hope my tone was OK. My concern is also for my wife Mariah Vera, under the care of an [facility], who has had intense leg pain for 6 weeks as multiple specialists have unsuccessfully tried to diagnose. She'll be on Norco for weeks due to scheduling delays, after a desperate ER visit for pain relief. Could she get some time with you to review the issues, and also switch to your care?

## 2023-05-01 ENCOUNTER — PATIENT MESSAGE (OUTPATIENT)
Dept: FAMILY MEDICINE CLINIC | Facility: CLINIC | Age: 68
End: 2023-05-01

## 2023-05-01 DIAGNOSIS — D64.9 ANEMIA, UNSPECIFIED TYPE: ICD-10-CM

## 2023-05-01 DIAGNOSIS — D50.0 NORMOCYTIC ANEMIA DUE TO BLOOD LOSS: ICD-10-CM

## 2023-05-01 DIAGNOSIS — Z13.29 SCREENING FOR ENDOCRINE, NUTRITIONAL, METABOLIC AND IMMUNITY DISORDER: ICD-10-CM

## 2023-05-01 DIAGNOSIS — Z13.0 SCREENING FOR ENDOCRINE, NUTRITIONAL, METABOLIC AND IMMUNITY DISORDER: ICD-10-CM

## 2023-05-01 DIAGNOSIS — Z13.228 SCREENING FOR ENDOCRINE, NUTRITIONAL, METABOLIC AND IMMUNITY DISORDER: ICD-10-CM

## 2023-05-01 DIAGNOSIS — Z13.21 SCREENING FOR ENDOCRINE, NUTRITIONAL, METABOLIC AND IMMUNITY DISORDER: ICD-10-CM

## 2023-05-01 DIAGNOSIS — E78.2 MIXED HYPERLIPIDEMIA: ICD-10-CM

## 2023-05-02 NOTE — TELEPHONE ENCOUNTER
Let him know I signed his orders with his iron studies. Typically I do not allow patients to do this because if I do 10 physicals a day and everybody is calling for this with the office would come to a halt. However-I understand in his case this has been an unusual circumstance and we have been monitoring his iron.     Please inform

## 2023-05-02 NOTE — TELEPHONE ENCOUNTER
Please see my chart message and advise. Pt would like to complete lab work before his appointment on 5/26. Lab orders pended for provider review and signature.

## 2023-05-02 NOTE — TELEPHONE ENCOUNTER
From: Malinda Kenny  To: Denis Pedroza DO  Sent: 5/1/2023 10:46 AM CDT  Subject: Blood draw for May26 follow up    I'd like to get my blood draw a couple days before my May26 med check, especially Iron/Hg/Ferritin. Thanks.

## 2023-05-03 ENCOUNTER — HOSPITAL ENCOUNTER (OUTPATIENT)
Dept: CT IMAGING | Facility: HOSPITAL | Age: 68
Discharge: HOME | End: 2023-05-03
Attending: INTERNAL MEDICINE
Payer: COMMERCIAL

## 2023-05-03 DIAGNOSIS — D50.8 OTHER IRON DEFICIENCY ANEMIA: ICD-10-CM

## 2023-05-03 LAB
CREAT BLD-MCNC: 1.1 MG/DL
GFR SERPLBLD BASED ON 1.73 SQ M-ARVRAT: 73 ML/MIN/1.73M2 (ref 60–?)

## 2023-05-03 PROCEDURE — 74177 CT ABD & PELVIS W/CONTRAST: CPT | Performed by: INTERNAL MEDICINE

## 2023-05-03 PROCEDURE — 82565 ASSAY OF CREATININE: CPT

## 2023-05-23 ENCOUNTER — LAB ENCOUNTER (OUTPATIENT)
Dept: LAB | Age: 68
End: 2023-05-23
Attending: FAMILY MEDICINE
Payer: COMMERCIAL

## 2023-05-23 DIAGNOSIS — Z13.21 SCREENING FOR ENDOCRINE, NUTRITIONAL, METABOLIC AND IMMUNITY DISORDER: ICD-10-CM

## 2023-05-23 DIAGNOSIS — Z13.0 SCREENING FOR ENDOCRINE, NUTRITIONAL, METABOLIC AND IMMUNITY DISORDER: ICD-10-CM

## 2023-05-23 DIAGNOSIS — E78.2 MIXED HYPERLIPIDEMIA: ICD-10-CM

## 2023-05-23 DIAGNOSIS — Z13.228 SCREENING FOR ENDOCRINE, NUTRITIONAL, METABOLIC AND IMMUNITY DISORDER: ICD-10-CM

## 2023-05-23 DIAGNOSIS — D50.0 NORMOCYTIC ANEMIA DUE TO BLOOD LOSS: ICD-10-CM

## 2023-05-23 DIAGNOSIS — Z13.29 SCREENING FOR ENDOCRINE, NUTRITIONAL, METABOLIC AND IMMUNITY DISORDER: ICD-10-CM

## 2023-05-23 LAB
ALBUMIN SERPL-MCNC: 3.9 G/DL (ref 3.4–5)
ALBUMIN/GLOB SERPL: 1.2 {RATIO} (ref 1–2)
ALP LIVER SERPL-CCNC: 36 U/L
ALT SERPL-CCNC: 19 U/L
ANION GAP SERPL CALC-SCNC: 2 MMOL/L (ref 0–18)
AST SERPL-CCNC: 19 U/L (ref 15–37)
BASOPHILS # BLD AUTO: 0.03 X10(3) UL (ref 0–0.2)
BASOPHILS NFR BLD AUTO: 0.6 %
BILIRUB SERPL-MCNC: 0.8 MG/DL (ref 0.1–2)
BUN BLD-MCNC: 17 MG/DL (ref 7–18)
CALCIUM BLD-MCNC: 9.1 MG/DL (ref 8.5–10.1)
CHLORIDE SERPL-SCNC: 110 MMOL/L (ref 98–112)
CHOLEST SERPL-MCNC: 121 MG/DL (ref ?–200)
CO2 SERPL-SCNC: 26 MMOL/L (ref 21–32)
CREAT BLD-MCNC: 1.02 MG/DL
DEPRECATED HBV CORE AB SER IA-ACNC: 6.2 NG/ML
EOSINOPHIL # BLD AUTO: 0.14 X10(3) UL (ref 0–0.7)
EOSINOPHIL NFR BLD AUTO: 2.8 %
ERYTHROCYTE [DISTWIDTH] IN BLOOD BY AUTOMATED COUNT: 13.7 %
FASTING PATIENT LIPID ANSWER: YES
FASTING STATUS PATIENT QL REPORTED: YES
GFR SERPLBLD BASED ON 1.73 SQ M-ARVRAT: 80 ML/MIN/1.73M2 (ref 60–?)
GLOBULIN PLAS-MCNC: 3.3 G/DL (ref 2.8–4.4)
GLUCOSE BLD-MCNC: 99 MG/DL (ref 70–99)
HCT VFR BLD AUTO: 41.3 %
HDLC SERPL-MCNC: 45 MG/DL (ref 40–59)
HGB BLD-MCNC: 13.5 G/DL
IMM GRANULOCYTES # BLD AUTO: 0.02 X10(3) UL (ref 0–1)
IMM GRANULOCYTES NFR BLD: 0.4 %
IRON SATN MFR SERPL: 19 %
IRON SERPL-MCNC: 86 UG/DL
LDLC SERPL CALC-MCNC: 58 MG/DL (ref ?–100)
LYMPHOCYTES # BLD AUTO: 2.08 X10(3) UL (ref 1–4)
LYMPHOCYTES NFR BLD AUTO: 42 %
MCH RBC QN AUTO: 30.2 PG (ref 26–34)
MCHC RBC AUTO-ENTMCNC: 32.7 G/DL (ref 31–37)
MCV RBC AUTO: 92.4 FL
MONOCYTES # BLD AUTO: 0.59 X10(3) UL (ref 0.1–1)
MONOCYTES NFR BLD AUTO: 11.9 %
NEUTROPHILS # BLD AUTO: 2.09 X10 (3) UL (ref 1.5–7.7)
NEUTROPHILS # BLD AUTO: 2.09 X10(3) UL (ref 1.5–7.7)
NEUTROPHILS NFR BLD AUTO: 42.3 %
NONHDLC SERPL-MCNC: 76 MG/DL (ref ?–130)
OSMOLALITY SERPL CALC.SUM OF ELEC: 288 MOSM/KG (ref 275–295)
PLATELET # BLD AUTO: 174 10(3)UL (ref 150–450)
POTASSIUM SERPL-SCNC: 4.2 MMOL/L (ref 3.5–5.1)
PROT SERPL-MCNC: 7.2 G/DL (ref 6.4–8.2)
RBC # BLD AUTO: 4.47 X10(6)UL
SODIUM SERPL-SCNC: 138 MMOL/L (ref 136–145)
TIBC SERPL-MCNC: 462 UG/DL (ref 240–450)
TRANSFERRIN SERPL-MCNC: 310 MG/DL (ref 200–360)
TRIGL SERPL-MCNC: 92 MG/DL (ref 30–149)
TSI SER-ACNC: 1.56 MIU/ML (ref 0.36–3.74)
VLDLC SERPL CALC-MCNC: 13 MG/DL (ref 0–30)
WBC # BLD AUTO: 5 X10(3) UL (ref 4–11)

## 2023-05-23 PROCEDURE — 82728 ASSAY OF FERRITIN: CPT | Performed by: FAMILY MEDICINE

## 2023-05-23 PROCEDURE — 80061 LIPID PANEL: CPT | Performed by: FAMILY MEDICINE

## 2023-05-23 PROCEDURE — 83540 ASSAY OF IRON: CPT | Performed by: FAMILY MEDICINE

## 2023-05-23 PROCEDURE — 83550 IRON BINDING TEST: CPT | Performed by: FAMILY MEDICINE

## 2023-05-23 PROCEDURE — 80050 GENERAL HEALTH PANEL: CPT | Performed by: FAMILY MEDICINE

## 2023-05-26 ENCOUNTER — TELEPHONE (OUTPATIENT)
Dept: FAMILY MEDICINE CLINIC | Facility: CLINIC | Age: 68
End: 2023-05-26

## 2023-05-26 ENCOUNTER — OFFICE VISIT (OUTPATIENT)
Dept: FAMILY MEDICINE CLINIC | Facility: CLINIC | Age: 68
End: 2023-05-26
Payer: COMMERCIAL

## 2023-05-26 VITALS
HEART RATE: 85 BPM | TEMPERATURE: 97 F | WEIGHT: 210.63 LBS | HEIGHT: 71.06 IN | RESPIRATION RATE: 20 BRPM | OXYGEN SATURATION: 96 % | DIASTOLIC BLOOD PRESSURE: 76 MMHG | SYSTOLIC BLOOD PRESSURE: 131 MMHG | BODY MASS INDEX: 29.49 KG/M2

## 2023-05-26 DIAGNOSIS — I10 ESSENTIAL HYPERTENSION: ICD-10-CM

## 2023-05-26 DIAGNOSIS — D50.0 NORMOCYTIC ANEMIA DUE TO BLOOD LOSS: ICD-10-CM

## 2023-05-26 DIAGNOSIS — E78.2 MIXED HYPERLIPIDEMIA: Primary | ICD-10-CM

## 2023-05-26 PROBLEM — D50.9 IRON DEFICIENCY ANEMIA, UNSPECIFIED: Status: RESOLVED | Noted: 2023-02-24 | Resolved: 2023-05-26

## 2023-05-26 PROCEDURE — 3078F DIAST BP <80 MM HG: CPT | Performed by: FAMILY MEDICINE

## 2023-05-26 PROCEDURE — 3008F BODY MASS INDEX DOCD: CPT | Performed by: FAMILY MEDICINE

## 2023-05-26 PROCEDURE — 99214 OFFICE O/P EST MOD 30 MIN: CPT | Performed by: FAMILY MEDICINE

## 2023-05-26 PROCEDURE — 3075F SYST BP GE 130 - 139MM HG: CPT | Performed by: FAMILY MEDICINE

## 2023-05-26 RX ORDER — LOSARTAN POTASSIUM 50 MG/1
50 TABLET ORAL DAILY
Qty: 90 TABLET | Refills: 1 | Status: SHIPPED | OUTPATIENT
Start: 2023-05-26

## 2023-05-26 RX ORDER — SIMVASTATIN 40 MG
40 TABLET ORAL NIGHTLY
Qty: 90 TABLET | Refills: 1 | Status: SHIPPED | OUTPATIENT
Start: 2023-05-26

## 2023-05-26 NOTE — TELEPHONE ENCOUNTER
Pt wanted to clarify why his medications where refilled because he did not ask for refills yet. Explained to pt that since he has OV, Dr. Britni Sharma sent refills for medications to pharmacy, pt v/u.

## 2023-06-05 PROBLEM — K22.70 BARRETT'S ESOPHAGUS WITHOUT DYSPLASIA: Status: ACTIVE | Noted: 2023-06-05

## 2023-06-05 PROBLEM — K21.9 GERD (GASTROESOPHAGEAL REFLUX DISEASE): Status: ACTIVE | Noted: 2023-06-05

## 2023-07-13 ENCOUNTER — OFFICE VISIT (OUTPATIENT)
Dept: HEMATOLOGY/ONCOLOGY | Facility: HOSPITAL | Age: 68
End: 2023-07-13
Attending: INTERNAL MEDICINE
Payer: COMMERCIAL

## 2023-07-13 VITALS
TEMPERATURE: 98 F | SYSTOLIC BLOOD PRESSURE: 157 MMHG | RESPIRATION RATE: 18 BRPM | BODY MASS INDEX: 29.73 KG/M2 | WEIGHT: 212.38 LBS | OXYGEN SATURATION: 97 % | DIASTOLIC BLOOD PRESSURE: 83 MMHG | HEIGHT: 71.06 IN | HEART RATE: 90 BPM

## 2023-07-13 DIAGNOSIS — Z52.008 BLOOD DONOR: ICD-10-CM

## 2023-07-13 DIAGNOSIS — D50.8 OTHER IRON DEFICIENCY ANEMIA: Primary | ICD-10-CM

## 2023-07-13 PROCEDURE — 99244 OFF/OP CNSLTJ NEW/EST MOD 40: CPT | Performed by: INTERNAL MEDICINE

## 2023-07-13 RX ORDER — PRASTERONE (DHEA) 50 MG
CAPSULE ORAL
COMMUNITY

## 2023-07-13 NOTE — PATIENT INSTRUCTIONS
For triage nurse: [phone] Monday through Friday 7:30-5:00.  *Please note this is a new phone number*    After hours or weekends for emergent needs:  [phone].      To schedule diagnostic testing: Central [phone]    For Medical Records: [phone]

## 2023-07-14 ENCOUNTER — PATIENT MESSAGE (OUTPATIENT)
Dept: FAMILY MEDICINE CLINIC | Facility: CLINIC | Age: 68
End: 2023-07-14

## 2023-07-14 NOTE — TELEPHONE ENCOUNTER
From: Eddie Kenny  To: Debora Carroll DO  Sent: 7/14/2023 7:19 AM CDT  Subject: Consult with Lucio Daily    Thanks for your referral to [facility]. She and staff were very impressive. Her visit notes show no details, but basically she gave me detailed instructions for iron supplementation and advised to ask you for a 3-month FU blood profile (about Oct 13) and to keep her informed. I'll ping you in about 3 months, unless you want to enter the order now.

## 2023-07-14 NOTE — TELEPHONE ENCOUNTER
Please see my chart message and advise. Pt asking for repeat blood work in October. Does he need OV first, or is he okay to get repeat labs done without OV?

## 2023-07-26 DIAGNOSIS — K21.9 GASTROESOPHAGEAL REFLUX DISEASE: ICD-10-CM

## 2023-07-27 RX ORDER — OMEPRAZOLE 20 MG/1
CAPSULE, DELAYED RELEASE ORAL
Qty: 90 CAPSULE | Refills: 2 | Status: SHIPPED | OUTPATIENT
Start: 2023-07-27

## 2023-07-27 NOTE — TELEPHONE ENCOUNTER
Refill no protocol available:     Pt requesting refill of   Requested Prescriptions     Pending Prescriptions Disp Refills    OMEPRAZOLE 20 MG Oral Capsule Delayed Release [Pharmacy Med Name: omeprazole 20 mg capsule,delayed release] 90 capsule 2     Sig: TAKE ONE CAPSULE BY MOUTH EVERY MORNING     Sent to Provider for review:    Last Time Medication was Filled:  11/28/2022    Last Office Visit with Provider: 5/26/2023    No future appointments. Instructions from 5/26/2023 OV      Return in about 27 weeks (around 12/1/2023) for annual physical, discuss labs.

## 2023-09-12 ENCOUNTER — PATIENT MESSAGE (OUTPATIENT)
Dept: FAMILY MEDICINE CLINIC | Facility: CLINIC | Age: 68
End: 2023-09-12

## 2023-09-13 ENCOUNTER — TELEPHONE (OUTPATIENT)
Dept: FAMILY MEDICINE CLINIC | Facility: CLINIC | Age: 68
End: 2023-09-13

## 2023-09-13 ENCOUNTER — E-VISIT (OUTPATIENT)
Dept: FAMILY MEDICINE CLINIC | Facility: CLINIC | Age: 68
End: 2023-09-13
Payer: COMMERCIAL

## 2023-09-13 DIAGNOSIS — U07.1 COVID-19: Primary | ICD-10-CM

## 2023-09-14 PROBLEM — U07.1 COVID-19: Status: ACTIVE | Noted: 2023-09-14

## 2023-09-14 PROCEDURE — 99421 OL DIG E/M SVC 5-10 MIN: CPT | Performed by: FAMILY MEDICINE

## 2023-09-14 RX ORDER — NIRMATRELVIR AND RITONAVIR 300-100 MG
KIT ORAL
Qty: 30 TABLET | Refills: 0 | Status: SHIPPED | OUTPATIENT
Start: 2023-09-14

## 2023-09-18 ENCOUNTER — PATIENT MESSAGE (OUTPATIENT)
Dept: FAMILY MEDICINE CLINIC | Facility: CLINIC | Age: 68
End: 2023-09-18

## 2023-10-16 ENCOUNTER — OFFICE VISIT (OUTPATIENT)
Dept: FAMILY MEDICINE CLINIC | Facility: CLINIC | Age: 68
End: 2023-10-16
Payer: COMMERCIAL

## 2023-10-16 ENCOUNTER — PATIENT MESSAGE (OUTPATIENT)
Dept: FAMILY MEDICINE CLINIC | Facility: CLINIC | Age: 68
End: 2023-10-16

## 2023-10-16 VITALS
HEIGHT: 71.16 IN | TEMPERATURE: 97 F | RESPIRATION RATE: 16 BRPM | HEART RATE: 68 BPM | OXYGEN SATURATION: 98 % | BODY MASS INDEX: 28.85 KG/M2 | WEIGHT: 208.38 LBS | DIASTOLIC BLOOD PRESSURE: 70 MMHG | SYSTOLIC BLOOD PRESSURE: 124 MMHG

## 2023-10-16 DIAGNOSIS — E78.2 MIXED HYPERLIPIDEMIA: ICD-10-CM

## 2023-10-16 DIAGNOSIS — I71.21 ANEURYSM OF ASCENDING AORTA WITHOUT RUPTURE (HCC): ICD-10-CM

## 2023-10-16 DIAGNOSIS — K22.70 BARRETT'S ESOPHAGUS DETERMINED BY ENDOSCOPY: ICD-10-CM

## 2023-10-16 DIAGNOSIS — D50.0 NORMOCYTIC ANEMIA DUE TO BLOOD LOSS: ICD-10-CM

## 2023-10-16 DIAGNOSIS — Z00.00 ANNUAL PHYSICAL EXAM: Primary | ICD-10-CM

## 2023-10-16 DIAGNOSIS — I77.810 DILATATION OF THORACIC AORTA (HCC): ICD-10-CM

## 2023-10-16 DIAGNOSIS — I10 ESSENTIAL HYPERTENSION: ICD-10-CM

## 2023-10-16 DIAGNOSIS — K21.9 GASTROESOPHAGEAL REFLUX DISEASE WITHOUT ESOPHAGITIS: ICD-10-CM

## 2023-10-16 DIAGNOSIS — E53.8 B12 DEFICIENCY: ICD-10-CM

## 2023-10-16 PROBLEM — U07.1 COVID-19: Status: RESOLVED | Noted: 2023-09-14 | Resolved: 2023-10-16

## 2023-10-16 LAB
DEPRECATED HBV CORE AB SER IA-ACNC: 64.9 NG/ML
IRON SATN MFR SERPL: 19 %
IRON SERPL-MCNC: 76 UG/DL
TIBC SERPL-MCNC: 392 UG/DL (ref 240–450)
TRANSFERRIN SERPL-MCNC: 263 MG/DL (ref 200–360)

## 2023-10-16 PROCEDURE — 83550 IRON BINDING TEST: CPT | Performed by: FAMILY MEDICINE

## 2023-10-16 PROCEDURE — 82728 ASSAY OF FERRITIN: CPT | Performed by: FAMILY MEDICINE

## 2023-10-16 PROCEDURE — 83540 ASSAY OF IRON: CPT | Performed by: FAMILY MEDICINE

## 2023-10-16 RX ORDER — SIMVASTATIN 40 MG
40 TABLET ORAL NIGHTLY
Qty: 90 TABLET | Refills: 1 | Status: SHIPPED | OUTPATIENT
Start: 2023-10-16

## 2023-10-16 RX ORDER — LOSARTAN POTASSIUM 50 MG/1
50 TABLET ORAL DAILY
Qty: 90 TABLET | Refills: 1 | Status: SHIPPED | OUTPATIENT
Start: 2023-10-16

## 2023-10-16 NOTE — PATIENT INSTRUCTIONS
Perform labs fasting 8 hours with water or black coffee or or black tea diet  soda only prior to exam.    -Encourage healthy diet of whole food and avoid processed food and sugary drinks and sodas. Diet should include lean meats and vegetables including 5-7 servings of fruit and vegetables total in 1 day. Never skip breakfast.  -Encouraged exercise 30 minutes or more 5 times weekly to prevent obesity and chronic disease and eliminate stress and its effect on the body. Total 150mins weekly or more. -encouraged to continue not smoking  - recommend condom use per CDC recommendation for all  or unmarried couples  -  immunizations- annual influenza vaccine recommended  -Vitamin D3 1000- 2000 units daily recommended  -Needs 1000mg of calcium daily for osteoporosis prevention discussed. Need to ingest 1000mg of calcium daily to prevent osteoporosis later in life. I.e. one 8 ounce glass of silk Register milk has 450 mg of calcium and label states 45%. Labels list calcium percentages not milligrams. To calculate milligrams per serving remove the percentage and add a zero (0).  I.e.  9% calcium equals 90 mg

## 2023-10-17 NOTE — TELEPHONE ENCOUNTER
From: Tessa Kenny  To: Mireya Rowell  Sent: 10/16/2023 5:32 PM CDT  Subject: Alexia Lerner,     As promised, I recommend [facility] as a local dentist. [email or web address]    Thanks for your input today.

## 2023-11-10 ENCOUNTER — PATIENT MESSAGE (OUTPATIENT)
Dept: FAMILY MEDICINE CLINIC | Facility: CLINIC | Age: 68
End: 2023-11-10

## 2024-01-26 ENCOUNTER — MED REC SCAN ONLY (OUTPATIENT)
Dept: FAMILY MEDICINE CLINIC | Facility: CLINIC | Age: 69
End: 2024-01-26

## 2024-03-26 ENCOUNTER — MED REC SCAN ONLY (OUTPATIENT)
Dept: FAMILY MEDICINE CLINIC | Facility: CLINIC | Age: 69
End: 2024-03-26

## 2024-04-05 ENCOUNTER — PATIENT MESSAGE (OUTPATIENT)
Dept: FAMILY MEDICINE CLINIC | Facility: CLINIC | Age: 69
End: 2024-04-05

## 2024-04-05 NOTE — TELEPHONE ENCOUNTER
Dr Harry, Pt would like referral to ENT - He has PPO, so likely doesn't need one. But it doesn't look like he's had a sleep study. Do you want an OV to discuss first?

## 2024-04-05 NOTE — TELEPHONE ENCOUNTER
From: Vaughn Kenny  To: Mary Harry  Sent: 4/5/2024 8:13 AM CDT  Subject: Referral    I am hoping for a referral to an ENT to discuss snoring and perhaps get an appliance. Sometime in the past 10 years an ENT from [place] did a microwave procedure that was successful, but a call to [facility] shows no record of my visit. My mouthguard for teeth grinding provided some relief, but was recently replaced by a dentist with a smaller device that no longer provides relief. Thanks.

## 2024-04-12 ENCOUNTER — LAB ENCOUNTER (OUTPATIENT)
Dept: LAB | Age: 69
End: 2024-04-12
Attending: FAMILY MEDICINE
Payer: COMMERCIAL

## 2024-04-12 DIAGNOSIS — E78.2 MIXED HYPERLIPIDEMIA: ICD-10-CM

## 2024-04-12 DIAGNOSIS — D50.0 NORMOCYTIC ANEMIA DUE TO BLOOD LOSS: ICD-10-CM

## 2024-04-12 DIAGNOSIS — I10 ESSENTIAL HYPERTENSION: ICD-10-CM

## 2024-04-12 LAB
ALBUMIN SERPL-MCNC: 3.8 G/DL (ref 3.4–5)
ALBUMIN/GLOB SERPL: 1.2 {RATIO} (ref 1–2)
ALP LIVER SERPL-CCNC: 40 U/L
ALT SERPL-CCNC: 19 U/L
ANION GAP SERPL CALC-SCNC: 5 MMOL/L (ref 0–18)
AST SERPL-CCNC: 7 U/L (ref 15–37)
BASOPHILS # BLD AUTO: 0.05 X10(3) UL (ref 0–0.2)
BASOPHILS NFR BLD AUTO: 0.9 %
BILIRUB SERPL-MCNC: 0.8 MG/DL (ref 0.1–2)
BUN BLD-MCNC: 14 MG/DL (ref 9–23)
CALCIUM BLD-MCNC: 9.4 MG/DL (ref 8.5–10.1)
CHLORIDE SERPL-SCNC: 110 MMOL/L (ref 98–112)
CHOLEST SERPL-MCNC: 156 MG/DL (ref ?–200)
CO2 SERPL-SCNC: 26 MMOL/L (ref 21–32)
CREAT BLD-MCNC: 1.17 MG/DL
DEPRECATED HBV CORE AB SER IA-ACNC: 42.4 NG/ML
EGFRCR SERPLBLD CKD-EPI 2021: 67 ML/MIN/1.73M2 (ref 60–?)
EOSINOPHIL # BLD AUTO: 0.13 X10(3) UL (ref 0–0.7)
EOSINOPHIL NFR BLD AUTO: 2.5 %
ERYTHROCYTE [DISTWIDTH] IN BLOOD BY AUTOMATED COUNT: 12.2 %
FASTING PATIENT LIPID ANSWER: YES
FASTING STATUS PATIENT QL REPORTED: YES
GLOBULIN PLAS-MCNC: 3.2 G/DL (ref 2.8–4.4)
GLUCOSE BLD-MCNC: 99 MG/DL (ref 70–99)
HCT VFR BLD AUTO: 45.1 %
HDLC SERPL-MCNC: 44 MG/DL (ref 40–59)
HGB BLD-MCNC: 15.1 G/DL
IMM GRANULOCYTES # BLD AUTO: 0.02 X10(3) UL (ref 0–1)
IMM GRANULOCYTES NFR BLD: 0.4 %
IRON SATN MFR SERPL: 24 %
IRON SERPL-MCNC: 87 UG/DL
LDLC SERPL CALC-MCNC: 94 MG/DL (ref ?–100)
LYMPHOCYTES # BLD AUTO: 1.99 X10(3) UL (ref 1–4)
LYMPHOCYTES NFR BLD AUTO: 37.8 %
MCH RBC QN AUTO: 33.3 PG (ref 26–34)
MCHC RBC AUTO-ENTMCNC: 33.5 G/DL (ref 31–37)
MCV RBC AUTO: 99.3 FL
MONOCYTES # BLD AUTO: 0.51 X10(3) UL (ref 0.1–1)
MONOCYTES NFR BLD AUTO: 9.7 %
NEUTROPHILS # BLD AUTO: 2.57 X10 (3) UL (ref 1.5–7.7)
NEUTROPHILS # BLD AUTO: 2.57 X10(3) UL (ref 1.5–7.7)
NEUTROPHILS NFR BLD AUTO: 48.7 %
NONHDLC SERPL-MCNC: 112 MG/DL (ref ?–130)
OSMOLALITY SERPL CALC.SUM OF ELEC: 293 MOSM/KG (ref 275–295)
PLATELET # BLD AUTO: 189 10(3)UL (ref 150–450)
POTASSIUM SERPL-SCNC: 4.5 MMOL/L (ref 3.5–5.1)
PROT SERPL-MCNC: 7 G/DL (ref 6.4–8.2)
RBC # BLD AUTO: 4.54 X10(6)UL
SODIUM SERPL-SCNC: 141 MMOL/L (ref 136–145)
TIBC SERPL-MCNC: 358 UG/DL (ref 240–450)
TRANSFERRIN SERPL-MCNC: 240 MG/DL (ref 200–360)
TRIGL SERPL-MCNC: 99 MG/DL (ref 30–149)
VLDLC SERPL CALC-MCNC: 16 MG/DL (ref 0–30)
WBC # BLD AUTO: 5.3 X10(3) UL (ref 4–11)

## 2024-04-12 PROCEDURE — 83550 IRON BINDING TEST: CPT | Performed by: FAMILY MEDICINE

## 2024-04-12 PROCEDURE — 83540 ASSAY OF IRON: CPT | Performed by: FAMILY MEDICINE

## 2024-04-12 PROCEDURE — 80061 LIPID PANEL: CPT | Performed by: FAMILY MEDICINE

## 2024-04-12 PROCEDURE — 85025 COMPLETE CBC W/AUTO DIFF WBC: CPT | Performed by: FAMILY MEDICINE

## 2024-04-12 PROCEDURE — 80053 COMPREHEN METABOLIC PANEL: CPT | Performed by: FAMILY MEDICINE

## 2024-04-12 PROCEDURE — 82728 ASSAY OF FERRITIN: CPT | Performed by: FAMILY MEDICINE

## 2024-04-17 DIAGNOSIS — K21.9 GASTROESOPHAGEAL REFLUX DISEASE: ICD-10-CM

## 2024-04-17 NOTE — TELEPHONE ENCOUNTER
Nursing please call [facility] and Dr. Nice's/Dr. King is office to inquire if any of them make an appliance to help with sleep apnea refer to patient's message.    If neither of these offices offer the services, please look at [facility] or [facility] physician referral services.    Thank you

## 2024-04-18 RX ORDER — OMEPRAZOLE 20 MG/1
CAPSULE, DELAYED RELEASE ORAL
Qty: 90 CAPSULE | Refills: 2 | Status: SHIPPED | OUTPATIENT
Start: 2024-04-18

## 2024-04-18 NOTE — TELEPHONE ENCOUNTER
Refill Passed Protocol:     Pt requesting refill of   Requested Prescriptions     Pending Prescriptions Disp Refills    OMEPRAZOLE 20 MG Oral Capsule Delayed Release [Pharmacy Med Name: omeprazole 20 mg capsule,delayed release] 90 capsule 2     Sig: TAKE ONE CAPSULE BY MOUTH EVERY MORNING      Refill was approved and sent to pharmacy:     Gastrointestional Medication Protocol [id number]/17/2024 10:54 AM   Protocol Details In person appointment or virtual visit in the past 12 mos or appointment in next 3 mos        Last Time Medication was Filled:  7/27/2023    Last Office Visit with Provider: 10/16/2023    Appt. scheduled on 4/23/2024

## 2024-04-23 ENCOUNTER — OFFICE VISIT (OUTPATIENT)
Dept: FAMILY MEDICINE CLINIC | Facility: CLINIC | Age: 69
End: 2024-04-23
Payer: COMMERCIAL

## 2024-04-23 VITALS
HEIGHT: 71 IN | DIASTOLIC BLOOD PRESSURE: 60 MMHG | RESPIRATION RATE: 16 BRPM | OXYGEN SATURATION: 97 % | WEIGHT: 209 LBS | HEART RATE: 71 BPM | TEMPERATURE: 97 F | SYSTOLIC BLOOD PRESSURE: 112 MMHG | BODY MASS INDEX: 29.26 KG/M2

## 2024-04-23 DIAGNOSIS — I10 ESSENTIAL HYPERTENSION: ICD-10-CM

## 2024-04-23 DIAGNOSIS — E78.2 MIXED HYPERLIPIDEMIA: Primary | ICD-10-CM

## 2024-04-23 DIAGNOSIS — Z86.39 HISTORY OF IRON DEFICIENCY: ICD-10-CM

## 2024-04-23 DIAGNOSIS — I71.21 ANEURYSM OF ASCENDING AORTA WITHOUT RUPTURE (HCC): ICD-10-CM

## 2024-04-23 DIAGNOSIS — K22.70 BARRETT'S ESOPHAGUS DETERMINED BY ENDOSCOPY: ICD-10-CM

## 2024-04-23 PROCEDURE — 3008F BODY MASS INDEX DOCD: CPT | Performed by: FAMILY MEDICINE

## 2024-04-23 PROCEDURE — 3074F SYST BP LT 130 MM HG: CPT | Performed by: FAMILY MEDICINE

## 2024-04-23 PROCEDURE — 99214 OFFICE O/P EST MOD 30 MIN: CPT | Performed by: FAMILY MEDICINE

## 2024-04-23 PROCEDURE — 3078F DIAST BP <80 MM HG: CPT | Performed by: FAMILY MEDICINE

## 2024-04-23 PROCEDURE — G2211 COMPLEX E/M VISIT ADD ON: HCPCS | Performed by: FAMILY MEDICINE

## 2024-04-23 PROCEDURE — 96127 BRIEF EMOTIONAL/BEHAV ASSMT: CPT | Performed by: FAMILY MEDICINE

## 2024-04-23 RX ORDER — SIMVASTATIN 40 MG
40 TABLET ORAL NIGHTLY
Qty: 90 TABLET | Refills: 1 | Status: SHIPPED | OUTPATIENT
Start: 2024-04-23

## 2024-04-23 RX ORDER — LOSARTAN POTASSIUM 50 MG/1
50 TABLET ORAL DAILY
Qty: 90 TABLET | Refills: 1 | Status: SHIPPED | OUTPATIENT
Start: 2024-04-23

## 2024-04-23 NOTE — PROGRESS NOTES
CHIEF COMPLAINT:   Chief Complaint   Patient presents with    Follow - Up     Mixed hyperlipidemia         HPI:     Vaughn Kenny is a 69 year old male presents for medication monitoring and discuss labs.    Patient presents for recheck of hypertension. Pt has been taking medications as instructed, no medication side effects, home BP monitoring in the range of 110-120's systolic and 70-80's diastolic.  Denies headache, dizziness, chest pain, shortness of breath, leg swelling    Pt presents for recheck of hyperlipidemia. Patient reports taking medications as instructed, no medication side effects noted. Denies any generalized muscle aches.  Admits he had had some muscle aches that he has associated with biking but wanted to be sure that was holding simvastatin then took a lower dose for a while that was left over.  Leg pain resolved.  Patient had leg pain even when off for a few days of simvastatin.  States not attributed to medication.  Denies any headache, dizziness, chest pain, shortness of breath, bleeding issues, leg swelling.  Bikes 100 miles a week.  Very active    Aortic aneurysm- widened root - followed by Dr.Ann Cruz. denies chest pain or sob- feels well.  States had several imaging and monitoring with no change and cardiologist suspects widened root is genetic.  Patient will have annual echocardiogram of the aortic aneurysm.  Stable x 15 years may be congenital.  Denies any chest pain or shortness of breath    H/0 Rectus diastasis- x years.  No abdominal pain. ADL and bike riding not affected.     WALTER due to blood donation.  Completed consult with Dr. Loredo-hematologist 8/2023-recommended starting iron patient has been taking and had repeat CBC only on 10/4/2023 at health screening work labs.  Stopped iron 10/2023 under recommendation of hematologist.  Not donating blood.  Had extensive GI work-up upper or lower endoscopy and capsule study in 2023.  Has history of Hardy's esophagus.  Hematologist  in consult attributes WALTER to blood donation.  Recommends if patient would like to donate blood needs to be on appropriate iron replacement.  For now patient is declining blood donation.  Off iron.  CBC and iron studies q. 6 months.      HISTORY:  Past Medical History:    Abdominal hernia    Hiatal    Allergic rhinitis    Minimal    Alopecia, unspecified    Arthritis    Consulted podiatrist 2017; Also bicep tendonitis    Atherosclerosis of coronary artery    Aortic aneurism; calcium score 90    Hardy's esophagus    x2    Change in hair    Anemia causing flaking nails    Decorative tattoo    Disorder of prostate    BPH    Esophageal reflux    Eye disease    Vitrectomies    Frequent urination    Urologist next week    Heartburn    3 year upper GI recalls at .    Hernia of unspecified site of abdominal cavity without mention of obstruction or gangrene    Indigestion    Resolved with Omeprazole    Lipid screening    Normocytic anemia due to blood loss    Other and unspecified hyperlipidemia    Unspecified essential hypertension    Visual impairment    Wears glasses      Past Surgical History:   Procedure Laterality Date    Cataract      Cholecystectomy      Colonoscopy  2008    Colonoscopy  2019    10-year recall    Egd  2023    Other surgical history      Upper Gastrointestinal Endoscopy    Other surgical history      gallbladder surgery    Other surgical history      Uvulectomy    Tonsillectomy  1960    Vasectomy        Family History   Problem Relation Age of Onset    Cancer Father         Stomach    Hypertension Father     Other (gout) Father     Lipids Father         Hyperlipidemia    Obesity Father     Other (hepatitis) Mother         blood transfusion    No Known Problems Daughter     No Known Problems Maternal Grandmother     No Known Problems Maternal Grandfather     No Known Problems Paternal Grandmother     Other (athersclerosis) Paternal Grandfather     Cancer Brother     No Known Problems Daughter        Social History:   Social History     Socioeconomic History    Marital status:    Tobacco Use    Smoking status: Never    Smokeless tobacco: Never   Vaping Use    Vaping status: Never Used   Substance and Sexual Activity    Alcohol use: Yes     Alcohol/week: 7.0 standard drinks of alcohol     Types: 7 Standard drinks or equivalent per week     Comment: 2 drinks daily    Drug use: Yes     Frequency: 5.0 times per week     Types: Cannabis    Sexual activity: Yes     Partners: Female   Other Topics Concern    Caffeine Concern Yes    Stress Concern No    Weight Concern No    Special Diet No    Exercise Yes    Seat Belt Yes        Medications (Active prior to today's visit):  Current Outpatient Medications   Medication Sig Dispense Refill    OMEPRAZOLE 20 MG Oral Capsule Delayed Release TAKE ONE CAPSULE BY MOUTH EVERY MORNING 90 capsule 2    simvastatin 40 MG Oral Tab Take 1 tablet (40 mg total) by mouth nightly. 90 tablet 1    losartan 50 MG Oral Tab Take 1 tablet (50 mg total) by mouth daily. 90 tablet 1    Bettina 500 MG Oral Cap Take by mouth.      Cyanocobalamin (B-12) 1000 MCG Oral Cap       loratadine 10 MG Oral Tab       Calcium Glycerophosphate (PRELIEF) 340 (65-50) MG (CA-P) Oral Tab       Turmeric, Curcuma Longa, (CURCUMIN EXTRACT) Does not apply Powder       fluorometholone 0.1 % Ophthalmic Suspension 1 drop.      Lutein 40 MG Oral Cap daily      MAGNESIUM MALATE OR Take 2 g by mouth.      Cholecalciferol (VITAMIN D-3) 1000 units Oral Cap          Allergies:  No Known Allergies    PSFH elements reviewed from today and agreed except as otherwise stated in HPI.  PHYSICAL EXAM:   /60 (BP Location: Left arm, Patient Position: Sitting, Cuff Size: adult)   Pulse 71   Temp 97.2 °F (36.2 °C) (Temporal)   Resp 16   Ht 5' 11\" (1.803 m)   Wt 209 lb (94.8 kg)   SpO2 97%   BMI 29.15 kg/m²   Vital signs reviewed.Appears stated age, well groomed.  Physical Exam   GENERAL: well developed, well  nourished,in no apparent distress  EYES; PERRLA, EOM-i B/L. Sclera clear and non icteric bilateral  SKIN: no rashes,no suspicious lesions  HEENT: atraumatic, normocephalic  NECK: supple,no adenopathy,no bruits, no JVD  LUNGS: clear to auscultation bilateral. No RRW. Good inspiratory and expiratory effort  CARDIO: RRR without murmur, clear S1, S2  VS: no carotid artery bruit bilateral.    GI: good BS's,no masses,No HSM or tenderness.   EXTREMITIES: no cyanosis, clubbing or edema, bilateral. No calf tenderness    LABS     [facility] Lab Encounter on 04/12/2024   Component Date Value    Glucose 04/12/2024 99     Sodium 04/12/2024 141     Potassium 04/12/2024 4.5     Chloride 04/12/2024 110     CO2 04/12/2024 26.0     Anion Gap 04/12/2024 5     BUN 04/12/2024 14     Creatinine 04/12/2024 1.17     Calcium, Total 04/12/2024 9.4     Calculated Osmolality 04/12/2024 293     eGFR-Cr 04/12/2024 67     AST 04/12/2024 7 (L)     ALT 04/12/2024 19     Alkaline Phosphatase 04/12/2024 40 (L)     Bilirubin, Total 04/12/2024 0.8     Total Protein 04/12/2024 7.0     Albumin 04/12/2024 3.8     Globulin  04/12/2024 3.2     A/G Ratio 04/12/2024 1.2     Patient Fasting for CMP? 04/12/2024 Yes     Cholesterol, Total 04/12/2024 156     HDL Cholesterol 04/12/2024 44     Triglycerides 04/12/2024 99     LDL Cholesterol 04/12/2024 94     VLDL 04/12/2024 16     Non HDL Chol 04/12/2024 112     Patient Fasting for Lipi* 04/12/2024 Yes     Ferritin 04/12/2024 42.4     Iron 04/12/2024 87     Transferrin 04/12/2024 240     Total Iron Binding Hunt* 04/12/2024 358     % Saturation 04/12/2024 24     WBC 04/12/2024 5.3     RBC 04/12/2024 4.54     HGB 04/12/2024 15.1     HCT 04/12/2024 45.1     PLT 04/12/2024 189.0     MCV 04/12/2024 99.3     MCH 04/12/2024 33.3     MCHC 04/12/2024 33.5     RDW 04/12/2024 12.2     Neutrophil Absolute Prel* 04/12/2024 2.57     Neutrophil Absolute 04/12/2024 2.57     Lymphocyte Absolute 04/12/2024 1.99     Monocyte Absolute  04/12/2024 0.51     Eosinophil Absolute 04/12/2024 0.13     Basophil Absolute 04/12/2024 0.05     Immature Granulocyte Abs* 04/12/2024 0.02     Neutrophil % 04/12/2024 48.7     Lymphocyte % 04/12/2024 37.8     Monocyte % 04/12/2024 9.7     Eosinophil % 04/12/2024 2.5     Basophil % 04/12/2024 0.9     Immature Granulocyte % 04/12/2024 0.4       Component      Latest Ref Rng 9/29/2020 11/12/2022 2/15/2023 3/15/2023   WBC      4.0 - 11.0 x10(3) uL 4.6  4.9 (E) 6.2  4.8    RBC      3.80 - 5.80 x10(6)uL 4.35  4.38 (E) 4.36  4.70    Hemoglobin      13.0 - 17.5 g/dL 14.5  12.6 (L) (E) 12.7 (L)  13.9    Hematocrit      39.0 - 53.0 % 43.2  39.3 (E) 39.6  43.1    Platelet Count      150.0 - 450.0 10(3)uL 179.0  210 (E) 215.0  211.0    MCV      80.0 - 100.0 fL 99.3  90 (E) 90.8  91.7    MCH      26.0 - 34.0 pg 33.3  28.8 (E) 29.1  29.6    MCHC      31.0 - 37.0 g/dL 33.6  32.1 (E) 32.1  32.3    RDW      % 12.0   14.1  14.3    RDW-SD      35.1 - 46.3 fL 44.0       Prelim Neutrophil Abs      1.50 - 7.70 x10 (3) uL 2.15   3.53  2.57    Neutrophils Absolute      1.50 - 7.70 x10(3) uL 2.15   3.53  2.57    Lymphocytes Absolute      1.00 - 4.00 x10(3) uL 1.77   1.89  1.63    Monocytes Absolute      0.10 - 1.00 x10(3) uL 0.48   0.60  0.44    Eosinophils Absolute      0.00 - 0.70 x10(3) uL 0.14   0.12  0.11    Basophils Absolute      0.00 - 0.20 x10(3) uL 0.04   0.04  0.03    Immature Granulocyte Absolute      0.00 - 1.00 x10(3) uL 0.03   0.01  0.01    Neutrophils %      % 46.6   57.1  53.7    Lymphocytes %      % 38.4   30.5  34.0    Monocytes %      % 10.4   9.7  9.2    Eosinophils %      % 3.0   1.9  2.3    Basophils %      % 0.9   0.6  0.6    Immature Granulocyte %      % 0.7   0.2  0.2      Component      Latest Ref Rn 5/23/2023 4/12/2024   WBC      4.0 - 11.0 x10(3) uL 5.0  5.3    RBC      3.80 - 5.80 x10(6)uL 4.47  4.54    Hemoglobin      13.0 - 17.5 g/dL 13.5  15.1    Hematocrit      39.0 - 53.0 % 41.3  45.1    Platelet Count       150.0 - 450.0 10(3)uL 174.0  189.0    MCV      80.0 - 100.0 fL 92.4  99.3    MCH      26.0 - 34.0 pg 30.2  33.3    MCHC      31.0 - 37.0 g/dL 32.7  33.5    RDW      % 13.7  12.2    RDW-SD      35.1 - 46.3 fL     Prelim Neutrophil Abs      1.50 - 7.70 x10 (3) uL 2.09  2.57    Neutrophils Absolute      1.50 - 7.70 x10(3) uL 2.09  2.57    Lymphocytes Absolute      1.00 - 4.00 x10(3) uL 2.08  1.99    Monocytes Absolute      0.10 - 1.00 x10(3) uL 0.59  0.51    Eosinophils Absolute      0.00 - 0.70 x10(3) uL 0.14  0.13    Basophils Absolute      0.00 - 0.20 x10(3) uL 0.03  0.05    Immature Granulocyte Absolute      0.00 - 1.00 x10(3) uL 0.02  0.02    Neutrophils %      % 42.3  48.7    Lymphocytes %      % 42.0  37.8    Monocytes %      % 11.9  9.7    Eosinophils %      % 2.8  2.5    Basophils %      % 0.6  0.9    Immature Granulocyte %      % 0.4  0.4       Legend:  (L) Low  (E) External lab result  REVIEWED THIS VISIT  ASSESSMENT/PLAN:   69 year old male with    1. Mixed hyperlipidemia  - simvastatin 40 MG Oral Tab; Take 1 tablet (40 mg total) by mouth nightly.  Dispense: 90 tablet; Refill: 1  LDL did increase-patient tried taking a lower dose of simvastatin or holding doses had some leg pain after biking and assumed it was due to the biking but wanted to be sure.  Continue simvastatin 40 mg  encourage mediterranean diet  Exercise brisk walk 30-60 mins at least 5 days weekly  Lose weight if overweight  Recheck fasting labs every 6 months    2. Essential hypertension  - losartan 50 MG Oral Tab; Take 1 tablet (50 mg total) by mouth daily.  Dispense: 90 tablet; Refill: 1  Controlled  Encouraged 150-130mins aerobic exercise weekly  <2g Na diet daily and follow Mediterranean diet  Weight loss if overweight  Home b/p monitoring  goal <140/90- 85% of time, optimal 110-120/70-80 bring readings and cuff to each visit.  Continue current medication  Labs and OV q 6 months    3. History of iron deficiency  Resolved  Not on iron.   Not donating blood  Last CBC 4/12/2024 with hemoglobin of 15.1 hematocrit 45.1 and normal MCV platelets excetra  Will monitor every 6 months to q. Year  Negative EGD and colonoscopy and small bowel follow-through study.    4. Aneurysm of ascending aorta without rupture (HCC)  Stable x 15 years  Now managed and monitored by Dr. Lizeth Cruz  Patient states will have annual follow-up    5. Hardy's esophagus determined by endoscopy  Needs EGD every 3 years will be managed by [facility] GI/[place]-[facility] from [facility]  Continue omeprazole      The patient and provider have a longitudinal relationship to address/treat the serious or complex condition as stated in this encounter.      Meds This Visit:  Requested Prescriptions     Signed Prescriptions Disp Refills    simvastatin 40 MG Oral Tab 90 tablet 1     Sig: Take 1 tablet (40 mg total) by mouth nightly.    losartan 50 MG Oral Tab 90 tablet 1     Sig: Take 1 tablet (50 mg total) by mouth daily.       Health Maintenance:  Health Maintenance   Topic Date Due    Annual Physical  10/16/2024    PSA  11/12/2024    Colorectal Cancer Screening  02/24/2033    Influenza Vaccine  Completed    Annual Depression Screening  Completed    Fall Risk Screening (Annual)  Completed    Pneumococcal Vaccine: 65+ Years  Completed    Zoster Vaccines  Completed    COVID-19 Vaccine  Completed         Patient/Caregiver Education: Patient/Caregiver Education: There are no barriers to learning. Medical education done.   Outcome: Patient verbalizes understanding. Patient is notified to call with any questions, comp lications, allergies, or worsening or changing symptoms.  Patient is to call with any side effects or complications from the treatments as a result of today.     Problem List:     Patient Active Problem List   Diagnosis    Hardy's esophagus determined by endoscopy    Diaphragmatic hernia without mention of obstruction or gangrene    Essential hypertension    Mixed  hyperlipidemia    Ascending aortic aneurysm (HCC)    Dilatation of thoracic aorta (HCC)    B12 deficiency    BMI 28.0-28.9,adult    Normocytic anemia due to blood loss    Iron deficiency anemia due to chronic blood loss    Family history of colon cancer    Chronic heartburn    Diverticulosis of large intestine without perforation or abscess without bleeding    GERD (gastroesophageal reflux disease)    Hardy's esophagus without dysplasia       Imaging & Referrals:  None     4/23/2024  Mary Harry, DO      Patient understands plan and follow-up.  Return in about 6 months (around 10/23/2024) for annual physical, medication monitoring, sooner if needed..

## 2024-04-23 NOTE — TELEPHONE ENCOUNTER
Patient completed OV with  today. Per provider pt  has found provider for ENT. No further action needed. Closing encounter

## 2024-04-23 NOTE — PATIENT INSTRUCTIONS
Component      Latest Ref Rng 9/29/2020 11/12/2022 2/15/2023 3/15/2023   WBC      4.0 - 11.0 x10(3) uL 4.6  4.9 (E) 6.2  4.8    RBC      3.80 - 5.80 x10(6)uL 4.35  4.38 (E) 4.36  4.70    Hemoglobin      13.0 - 17.5 g/dL 14.5  12.6 (L) (E) 12.7 (L)  13.9    Hematocrit      39.0 - 53.0 % 43.2  39.3 (E) 39.6  43.1    Platelet Count      150.0 - 450.0 10(3)uL 179.0  210 (E) 215.0  211.0    MCV      80.0 - 100.0 fL 99.3  90 (E) 90.8  91.7    MCH      26.0 - 34.0 pg 33.3  28.8 (E) 29.1  29.6    MCHC      31.0 - 37.0 g/dL 33.6  32.1 (E) 32.1  32.3    RDW      % 12.0   14.1  14.3    RDW-SD      35.1 - 46.3 fL 44.0       Prelim Neutrophil Abs      1.50 - 7.70 x10 (3) uL 2.15   3.53  2.57    Neutrophils Absolute      1.50 - 7.70 x10(3) uL 2.15   3.53  2.57    Lymphocytes Absolute      1.00 - 4.00 x10(3) uL 1.77   1.89  1.63    Monocytes Absolute      0.10 - 1.00 x10(3) uL 0.48   0.60  0.44    Eosinophils Absolute      0.00 - 0.70 x10(3) uL 0.14   0.12  0.11    Basophils Absolute      0.00 - 0.20 x10(3) uL 0.04   0.04  0.03    Immature Granulocyte Absolute      0.00 - 1.00 x10(3) uL 0.03   0.01  0.01    Neutrophils %      % 46.6   57.1  53.7    Lymphocytes %      % 38.4   30.5  34.0    Monocytes %      % 10.4   9.7  9.2    Eosinophils %      % 3.0   1.9  2.3    Basophils %      % 0.9   0.6  0.6    Immature Granulocyte %      % 0.7   0.2  0.2      Component      Latest Ref Rng 5/23/2023 4/12/2024   WBC      4.0 - 11.0 x10(3) uL 5.0  5.3    RBC      3.80 - 5.80 x10(6)uL 4.47  4.54    Hemoglobin      13.0 - 17.5 g/dL 13.5  15.1    Hematocrit      39.0 - 53.0 % 41.3  45.1    Platelet Count      150.0 - 450.0 10(3)uL 174.0  189.0    MCV      80.0 - 100.0 fL 92.4  99.3    MCH      26.0 - 34.0 pg 30.2  33.3    MCHC      31.0 - 37.0 g/dL 32.7  33.5    RDW      % 13.7  12.2    RDW-SD      35.1 - 46.3 fL     Prelim Neutrophil Abs      1.50 - 7.70 x10 (3) uL 2.09  2.57    Neutrophils Absolute      1.50 - 7.70 x10(3) uL 2.09  2.57     Lymphocytes Absolute      1.00 - 4.00 x10(3) uL 2.08  1.99    Monocytes Absolute      0.10 - 1.00 x10(3) uL 0.59  0.51    Eosinophils Absolute      0.00 - 0.70 x10(3) uL 0.14  0.13    Basophils Absolute      0.00 - 0.20 x10(3) uL 0.03  0.05    Immature Granulocyte Absolute      0.00 - 1.00 x10(3) uL 0.02  0.02    Neutrophils %      % 42.3  48.7    Lymphocytes %      % 42.0  37.8    Monocytes %      % 11.9  9.7    Eosinophils %      % 2.8  2.5    Basophils %      % 0.6  0.9    Immature Granulocyte %      % 0.4  0.4       Legend:  (L) Low  (E) External lab result

## 2024-05-09 ENCOUNTER — PATIENT MESSAGE (OUTPATIENT)
Dept: FAMILY MEDICINE CLINIC | Facility: CLINIC | Age: 69
End: 2024-05-09

## 2024-05-13 NOTE — TELEPHONE ENCOUNTER
From: Vaughn Kenny  To: Mary Harry  Sent: 5/9/2024 2:13 PM CDT  Subject: Question re new patient    Mary, my daughter has just moved back to [place] and is seeking a GP. Is there any chance [facility] She also has temporary [place] Medicaid approval. She could stop by your office with documentation if needed. Thanks.

## 2024-07-01 ENCOUNTER — OFFICE VISIT (OUTPATIENT)
Dept: FAMILY MEDICINE CLINIC | Facility: CLINIC | Age: 69
End: 2024-07-01
Payer: COMMERCIAL

## 2024-07-01 VITALS
DIASTOLIC BLOOD PRESSURE: 78 MMHG | WEIGHT: 210 LBS | HEART RATE: 80 BPM | OXYGEN SATURATION: 95 % | HEIGHT: 71 IN | TEMPERATURE: 97 F | RESPIRATION RATE: 22 BRPM | SYSTOLIC BLOOD PRESSURE: 126 MMHG | BODY MASS INDEX: 29.4 KG/M2

## 2024-07-01 DIAGNOSIS — D50.0 IRON DEFICIENCY ANEMIA DUE TO CHRONIC BLOOD LOSS: ICD-10-CM

## 2024-07-01 DIAGNOSIS — E78.2 MIXED HYPERLIPIDEMIA: ICD-10-CM

## 2024-07-01 DIAGNOSIS — K21.9 GASTROESOPHAGEAL REFLUX DISEASE WITHOUT ESOPHAGITIS: ICD-10-CM

## 2024-07-01 DIAGNOSIS — K22.70 BARRETT'S ESOPHAGUS WITHOUT DYSPLASIA: ICD-10-CM

## 2024-07-01 DIAGNOSIS — H33.312 RETINAL TEAR OF LEFT EYE: ICD-10-CM

## 2024-07-01 DIAGNOSIS — I10 ESSENTIAL HYPERTENSION: ICD-10-CM

## 2024-07-01 DIAGNOSIS — E53.8 B12 DEFICIENCY: ICD-10-CM

## 2024-07-01 DIAGNOSIS — D50.0 NORMOCYTIC ANEMIA DUE TO BLOOD LOSS: ICD-10-CM

## 2024-07-01 DIAGNOSIS — I71.21 ANEURYSM OF ASCENDING AORTA WITHOUT RUPTURE (HCC): ICD-10-CM

## 2024-07-01 DIAGNOSIS — Z01.818 PREOP EXAMINATION: Primary | ICD-10-CM

## 2024-07-01 LAB
ATRIAL RATE: 72 BPM
BASOPHILS # BLD AUTO: 0.04 X10(3) UL (ref 0–0.2)
BASOPHILS NFR BLD AUTO: 0.7 %
EOSINOPHIL # BLD AUTO: 0.06 X10(3) UL (ref 0–0.7)
EOSINOPHIL NFR BLD AUTO: 1.1 %
ERYTHROCYTE [DISTWIDTH] IN BLOOD BY AUTOMATED COUNT: 11.9 %
HCT VFR BLD AUTO: 46.6 %
HGB BLD-MCNC: 16.1 G/DL
IMM GRANULOCYTES # BLD AUTO: 0.03 X10(3) UL (ref 0–1)
IMM GRANULOCYTES NFR BLD: 0.5 %
LYMPHOCYTES # BLD AUTO: 1.37 X10(3) UL (ref 1–4)
LYMPHOCYTES NFR BLD AUTO: 25 %
MCH RBC QN AUTO: 33.3 PG (ref 26–34)
MCHC RBC AUTO-ENTMCNC: 34.5 G/DL (ref 31–37)
MCV RBC AUTO: 96.3 FL
MONOCYTES # BLD AUTO: 0.44 X10(3) UL (ref 0.1–1)
MONOCYTES NFR BLD AUTO: 8 %
NEUTROPHILS # BLD AUTO: 3.53 X10 (3) UL (ref 1.5–7.7)
NEUTROPHILS # BLD AUTO: 3.53 X10(3) UL (ref 1.5–7.7)
NEUTROPHILS NFR BLD AUTO: 64.7 %
P AXIS: 42 DEGREES
P-R INTERVAL: 162 MS
PLATELET # BLD AUTO: 190 10(3)UL (ref 150–450)
Q-T INTERVAL: 410 MS
QRS DURATION: 94 MS
QTC CALCULATION (BEZET): 448 MS
R AXIS: 51 DEGREES
RBC # BLD AUTO: 4.84 X10(6)UL
T AXIS: 48 DEGREES
VENTRICULAR RATE: 72 BPM
WBC # BLD AUTO: 5.5 X10(3) UL (ref 4–11)

## 2024-07-01 PROCEDURE — 85025 COMPLETE CBC W/AUTO DIFF WBC: CPT | Performed by: FAMILY MEDICINE

## 2024-07-01 NOTE — PROGRESS NOTES
CC: Preop exam.    HPI:   Vaughn Kenny is a 69 year old male who presents for a pre-operative physical exam requested  By Dr. Tyree Chun. Patient is to have left ey pars plana vitrectomy, secondary to left retinal tear to be on 7/9/2024.    No prior anaesthesia problem.  Pt complains of left eye lower visual field disturbance. Hx of prior vitrectomy due to retinal in left eye. Symptoms started in past 1-2 days.    Patient presents for recheck of hypertension. Pt has been taking medications as instructed, no medication side effects, home BP monitoring in the range of 110-120's systolic and 70-80's diastolic.  Denies headache, dizziness, chest pain, shortness of breath, leg swelling     Pt presents for recheck of hyperlipidemia. Patient reports taking medications as instructed, no medication side effects noted. Denies any generalized muscle aches.  Admits he had had some muscle aches that he has associated with biking but wanted to be sure that was holding simvastatin then took a lower dose for a while that was left over.  Leg pain resolved.  Patient had leg pain even when off for a few days of simvastatin.  States not attributed to medication.  Denies any headache, dizziness, chest pain, shortness of breath, bleeding issues, leg swelling.  Bikes 100 miles a week.  Very active     Aortic aneurysm- widened root - followed by Dr.Ann Cruz. denies chest pain or sob- feels well.  States had several imaging and monitoring with no change and cardiologist suspects widened root is genetic.  Patient will have annual echocardiogram of the aortic aneurysm.  Stable x 15 years may be congenital.  Denies any chest pain or shortness of breath or exercise intolerance     H/0 Rectus diastasis- x years.  No abdominal pain. ADL and bike riding not affected.      WALTER due to blood donation.  Completed consult with Dr. Loredo-hematologist 8/2023-recommended starting iron patient has been taking and had repeat CBC only on  10/4/2023 at health screening work labs.  Stopped iron 10/2023 under recommendation of hematologist.  Not donating blood.  Had extensive GI work-up upper or lower endoscopy and capsule study in 2023.  Has history of Hardy's esophagus.  Hematologist in consult attributes WALTER to blood donation.  GI recommends if patient would like to donate blood needs to be on appropriate iron replacement.  For now patient is declining blood donation.  Off iron.  CBC and iron studies q. 6 months.      7/1/2024   Stop Bang Evaluation    Do you snore loudly (loud enough to be heard through closed doors)? 0    Do you often feel tired, fatigued, or sleepy during the day? 0    Has anyone observed you stop breathing during your sleep? 0    Do you have or are you being treated for high blood pressure? 1    BMI more than 35kg/mg2? 0    Age over 50 years old? 1    Neck circumference >16 inches (40 cm)? 0    Gender Male? 1    Score and KIMBERLEE Risk 3 - Moderate Risk    Obstructive Sleep Apnea Risk High Risk of KIMBERLEE    Pt only snores if consumes alcohol or extremely tired and has mouth guard that will eliminate snoring  Pt wife has not complained pt has stopped breathing. Denies fatigue or hypersomnia or am headaches.           Current Outpatient Medications   Medication Sig Dispense Refill    simvastatin 40 MG Oral Tab Take 1 tablet (40 mg total) by mouth nightly. 90 tablet 1    losartan 50 MG Oral Tab Take 1 tablet (50 mg total) by mouth daily. 90 tablet 1    OMEPRAZOLE 20 MG Oral Capsule Delayed Release TAKE ONE CAPSULE BY MOUTH EVERY MORNING 90 capsule 2    Bettina 500 MG Oral Cap Take by mouth.      Cyanocobalamin (B-12) 1000 MCG Oral Cap       loratadine 10 MG Oral Tab       Calcium Glycerophosphate (PRELIEF) 340 (65-50) MG (CA-P) Oral Tab       Turmeric, Curcuma Longa, (CURCUMIN EXTRACT) Does not apply Powder       fluorometholone 0.1 % Ophthalmic Suspension 1 drop.      Lutein 40 MG Oral Cap daily      MAGNESIUM MALATE OR Take 2 g by mouth.       Cholecalciferol (VITAMIN D-3) 1000 units Oral Cap         Allergies: No Known Allergies   Past Medical History:    Abdominal hernia    Hiatal    Allergic rhinitis    Minimal    Alopecia, unspecified    Arthritis    Consulted podiatrist 2017; Also bicep tendonitis    Atherosclerosis of coronary artery    Aortic aneurism; calcium score 90    Hardy's esophagus    x2    Change in hair    Anemia causing flaking nails    Decorative tattoo    Disorder of prostate    BPH    Esophageal reflux    Eye disease    Vitrectomies    Frequent urination    Urologist next week    Heartburn    3 year upper GI recalls at [facility].    Hernia of unspecified site of abdominal cavity without mention of obstruction or gangrene    Indigestion    Resolved with Omeprazole    Lipid screening    Normocytic anemia due to blood loss    Other and unspecified hyperlipidemia    Unspecified essential hypertension    Visual impairment    Wears glasses      Past Surgical History:   Procedure Laterality Date    Cataract      Cholecystectomy      Colonoscopy  2008    Colonoscopy  2019    10-year recall    Egd  2023    Other surgical history      Upper Gastrointestinal Endoscopy    Other surgical history      gallbladder surgery    Other surgical history      Uvulectomy    Tonsillectomy  1960    Vasectomy        Family History   Problem Relation Age of Onset    Cancer Father         Stomach    Hypertension Father     Other (gout) Father     Lipids Father         Hyperlipidemia    Obesity Father     Other (hepatitis) Mother         blood transfusion    No Known Problems Daughter     No Known Problems Maternal Grandmother     No Known Problems Maternal Grandfather     No Known Problems Paternal Grandmother     Other (athersclerosis) Paternal Grandfather     Cancer Brother     No Known Problems Daughter       Social History:   Social History     Socioeconomic History    Marital status:    Tobacco Use    Smoking status: Never    Smokeless tobacco: Never    Vaping Use    Vaping status: Never Used   Substance and Sexual Activity    Alcohol use: Yes     Alcohol/week: 7.0 standard drinks of alcohol     Types: 7 Standard drinks or equivalent per week     Comment: 2 drinks daily    Drug use: Yes     Frequency: 5.0 times per week     Types: Cannabis    Sexual activity: Yes     Partners: Female   Other Topics Concern    Caffeine Concern Yes    Stress Concern No    Weight Concern No    Special Diet No    Exercise Yes    Seat Belt Yes      Occ: retired   Exercise: biking.  Diet: watches fats closely   Allergies:  No Known Allergies     REVIEW OF SYSTEMS:   GENERAL: feels well otherwise  SKIN: denies any unusual skin lesions  EYES:denies blurred vision or double vision  HEENT: denies nasal congestion, sinus pain or ST  LUNGS: denies shortness of breath with exertion  CARDIOVASCULAR: denies chest pain on exertion  GI: denies abdominal pain,denies heartburn  : no abnormal discharge  MUSCULOSKELETAL: denies back pain  NEURO: denies headaches  PSYCHE: denies depression or anxiety  HEMATOLOGIC: denies hx of anemia  ENDOCRINE: denies thyroid history  ALL/ASTHMA: denies hx of allergy or asthma    EXAM:   /78 (BP Location: Left arm, Patient Position: Sitting, Cuff Size: adult)   Pulse 80   Temp 97.3 °F (36.3 °C) (Temporal)   Resp 22   Ht 5' 11\" (1.803 m)   Wt 210 lb (95.3 kg)   SpO2 95%   BMI 29.29 kg/m²   GENERAL: well developed, well nourished,in no apparent distress  SKIN: no rashes,no suspicious lesions  HEENT: atraumatic, normocephalic,ears and throat are clear  EYES:PERRLA, EOMI, normal optic disk,conjunctiva are clear  NECK: supple,no adenopathy,no bruits  CHEST: no chest tenderness  LUNGS: clear to auscultation  CARDIO: RRR without murmur  GI: good BS's,no masses, HSM or tenderness  : deferred  MUSCULOSKELETAL: back is not tender,FROM of the back  EXTREMITIES: no cyanosis, clubbing or edema  NEURO: Oriented times three,cranial nerves are intact,motor and  sensory are grossly intact          Narrative  Performed by: MUSE  Normal sinus rhythm  Inferior infarct (cited on or before 27-MAR-2014)  Cannot rule out Anterior infarct (cited on or before 27-MAR-2014)  Abnormal ECG  When compared with ECG of 27-MAR-2014 06:49,  No significant change was found      Specimen Collected: 07/01/24  9:07 AM          ASSESSMENT AND PLAN:   Vaughn Kenny is a 69 year old male who presents for a pre-operative physical exam.    1. Preop examination  2. Retinal tear of left eye  3. Mixed hyperlipidemia  4. Essential hypertension  5. Gastroesophageal reflux disease without esophagitis  6. Hardy's esophagus without dysplasia  7. Normocytic anemia due to blood loss  8. B12 deficiency  9. Iron deficiency anemia due to chronic blood loss  10. Aneurysm of ascending aorta without rupture (HCC)  - EKG with interpretation and Report -IN OFFICE [[id number]]  - CBC W Differential W Platelet [E]; Future  - CBC W Differential W Platelet [E]  Patient's chronic conditions are controlled.  No prior history of complications with anesthesia.  EKG is unchanged from prior.  Sleep apnea screening is moderate risk but is due to treated hypertension, age and male sex.  Clinically patient has no clear symptoms of sleep apnea except occasional snoring.  Patient is acceptable risk to proceed with surgery.  EKG is stable.  If CBC has no significant abnormalities then patient is medically cleared for procedure.    Mary Harry DO, 07/01/24, 9:51 AM

## 2024-07-01 NOTE — H&P (VIEW-ONLY)
CC: Preop exam.    HPI:   Vaughn Kenny is a 69 year old male who presents for a pre-operative physical exam requested  By Dr. Tyree Chun. Patient is to have left ey pars plana vitrectomy, secondary to left retinal tear to be on 7/9/2024.    No prior anaesthesia problem.  Pt complains of left eye lower visual field disturbance. Hx of prior vitrectomy due to retinal in left eye. Symptoms started in past 1-2 days.    Patient presents for recheck of hypertension. Pt has been taking medications as instructed, no medication side effects, home BP monitoring in the range of 110-120's systolic and 70-80's diastolic.  Denies headache, dizziness, chest pain, shortness of breath, leg swelling     Pt presents for recheck of hyperlipidemia. Patient reports taking medications as instructed, no medication side effects noted. Denies any generalized muscle aches.  Admits he had had some muscle aches that he has associated with biking but wanted to be sure that was holding simvastatin then took a lower dose for a while that was left over.  Leg pain resolved.  Patient had leg pain even when off for a few days of simvastatin.  States not attributed to medication.  Denies any headache, dizziness, chest pain, shortness of breath, bleeding issues, leg swelling.  Bikes 100 miles a week.  Very active     Aortic aneurysm- widened root - followed by Dr.Ann Cruz. denies chest pain or sob- feels well.  States had several imaging and monitoring with no change and cardiologist suspects widened root is genetic.  Patient will have annual echocardiogram of the aortic aneurysm.  Stable x 15 years may be congenital.  Denies any chest pain or shortness of breath or exercise intolerance     H/0 Rectus diastasis- x years.  No abdominal pain. ADL and bike riding not affected.      WALTER due to blood donation.  Completed consult with Dr. Loredo-hematologist 8/2023-recommended starting iron patient has been taking and had repeat CBC only on  10/4/2023 at health screening work labs.  Stopped iron 10/2023 under recommendation of hematologist.  Not donating blood.  Had extensive GI work-up upper or lower endoscopy and capsule study in 2023.  Has history of Hardy's esophagus.  Hematologist in consult attributes WALTER to blood donation.  GI recommends if patient would like to donate blood needs to be on appropriate iron replacement.  For now patient is declining blood donation.  Off iron.  CBC and iron studies q. 6 months.      7/1/2024   Stop Bang Evaluation    Do you snore loudly (loud enough to be heard through closed doors)? 0    Do you often feel tired, fatigued, or sleepy during the day? 0    Has anyone observed you stop breathing during your sleep? 0    Do you have or are you being treated for high blood pressure? 1    BMI more than 35kg/mg2? 0    Age over 50 years old? 1    Neck circumference >16 inches (40 cm)? 0    Gender Male? 1    Score and KIMBERLEE Risk 3 - Moderate Risk    Obstructive Sleep Apnea Risk High Risk of KIMBERLEE    Pt only snores if consumes alcohol or extremely tired and has mouth guard that will eliminate snoring  Pt wife has not complained pt has stopped breathing. Denies fatigue or hypersomnia or am headaches.           Current Outpatient Medications   Medication Sig Dispense Refill    simvastatin 40 MG Oral Tab Take 1 tablet (40 mg total) by mouth nightly. 90 tablet 1    losartan 50 MG Oral Tab Take 1 tablet (50 mg total) by mouth daily. 90 tablet 1    OMEPRAZOLE 20 MG Oral Capsule Delayed Release TAKE ONE CAPSULE BY MOUTH EVERY MORNING 90 capsule 2    Bettina 500 MG Oral Cap Take by mouth.      Cyanocobalamin (B-12) 1000 MCG Oral Cap       loratadine 10 MG Oral Tab       Calcium Glycerophosphate (PRELIEF) 340 (65-50) MG (CA-P) Oral Tab       Turmeric, Curcuma Longa, (CURCUMIN EXTRACT) Does not apply Powder       fluorometholone 0.1 % Ophthalmic Suspension 1 drop.      Lutein 40 MG Oral Cap daily      MAGNESIUM MALATE OR Take 2 g by mouth.       Cholecalciferol (VITAMIN D-3) 1000 units Oral Cap         Allergies: No Known Allergies   Past Medical History:    Abdominal hernia    Hiatal    Allergic rhinitis    Minimal    Alopecia, unspecified    Arthritis    Consulted podiatrist 2017; Also bicep tendonitis    Atherosclerosis of coronary artery    Aortic aneurism; calcium score 90    Hardy's esophagus    x2    Change in hair    Anemia causing flaking nails    Decorative tattoo    Disorder of prostate    BPH    Esophageal reflux    Eye disease    Vitrectomies    Frequent urination    Urologist next week    Heartburn    3 year upper GI recalls at [facility].    Hernia of unspecified site of abdominal cavity without mention of obstruction or gangrene    Indigestion    Resolved with Omeprazole    Lipid screening    Normocytic anemia due to blood loss    Other and unspecified hyperlipidemia    Unspecified essential hypertension    Visual impairment    Wears glasses      Past Surgical History:   Procedure Laterality Date    Cataract      Cholecystectomy      Colonoscopy  2008    Colonoscopy  2019    10-year recall    Egd  2023    Other surgical history      Upper Gastrointestinal Endoscopy    Other surgical history      gallbladder surgery    Other surgical history      Uvulectomy    Tonsillectomy  1960    Vasectomy        Family History   Problem Relation Age of Onset    Cancer Father         Stomach    Hypertension Father     Other (gout) Father     Lipids Father         Hyperlipidemia    Obesity Father     Other (hepatitis) Mother         blood transfusion    No Known Problems Daughter     No Known Problems Maternal Grandmother     No Known Problems Maternal Grandfather     No Known Problems Paternal Grandmother     Other (athersclerosis) Paternal Grandfather     Cancer Brother     No Known Problems Daughter       Social History:   Social History     Socioeconomic History    Marital status:    Tobacco Use    Smoking status: Never    Smokeless tobacco: Never    Vaping Use    Vaping status: Never Used   Substance and Sexual Activity    Alcohol use: Yes     Alcohol/week: 7.0 standard drinks of alcohol     Types: 7 Standard drinks or equivalent per week     Comment: 2 drinks daily    Drug use: Yes     Frequency: 5.0 times per week     Types: Cannabis    Sexual activity: Yes     Partners: Female   Other Topics Concern    Caffeine Concern Yes    Stress Concern No    Weight Concern No    Special Diet No    Exercise Yes    Seat Belt Yes      Occ: retired   Exercise: biking.  Diet: watches fats closely   Allergies:  No Known Allergies     REVIEW OF SYSTEMS:   GENERAL: feels well otherwise  SKIN: denies any unusual skin lesions  EYES:denies blurred vision or double vision  HEENT: denies nasal congestion, sinus pain or ST  LUNGS: denies shortness of breath with exertion  CARDIOVASCULAR: denies chest pain on exertion  GI: denies abdominal pain,denies heartburn  : no abnormal discharge  MUSCULOSKELETAL: denies back pain  NEURO: denies headaches  PSYCHE: denies depression or anxiety  HEMATOLOGIC: denies hx of anemia  ENDOCRINE: denies thyroid history  ALL/ASTHMA: denies hx of allergy or asthma    EXAM:   /78 (BP Location: Left arm, Patient Position: Sitting, Cuff Size: adult)   Pulse 80   Temp 97.3 °F (36.3 °C) (Temporal)   Resp 22   Ht 5' 11\" (1.803 m)   Wt 210 lb (95.3 kg)   SpO2 95%   BMI 29.29 kg/m²   GENERAL: well developed, well nourished,in no apparent distress  SKIN: no rashes,no suspicious lesions  HEENT: atraumatic, normocephalic,ears and throat are clear  EYES:PERRLA, EOMI, normal optic disk,conjunctiva are clear  NECK: supple,no adenopathy,no bruits  CHEST: no chest tenderness  LUNGS: clear to auscultation  CARDIO: RRR without murmur  GI: good BS's,no masses, HSM or tenderness  : deferred  MUSCULOSKELETAL: back is not tender,FROM of the back  EXTREMITIES: no cyanosis, clubbing or edema  NEURO: Oriented times three,cranial nerves are intact,motor and  sensory are grossly intact          Narrative  Performed by: MUSE  Normal sinus rhythm  Inferior infarct (cited on or before 27-MAR-2014)  Cannot rule out Anterior infarct (cited on or before 27-MAR-2014)  Abnormal ECG  When compared with ECG of 27-MAR-2014 06:49,  No significant change was found      Specimen Collected: 07/01/24  9:07 AM          ASSESSMENT AND PLAN:   Vaughn Kenny is a 69 year old male who presents for a pre-operative physical exam.    1. Preop examination  2. Retinal tear of left eye  3. Mixed hyperlipidemia  4. Essential hypertension  5. Gastroesophageal reflux disease without esophagitis  6. Hardy's esophagus without dysplasia  7. Normocytic anemia due to blood loss  8. B12 deficiency  9. Iron deficiency anemia due to chronic blood loss  10. Aneurysm of ascending aorta without rupture (HCC)  - EKG with interpretation and Report -IN OFFICE [[id number]]  - CBC W Differential W Platelet [E]; Future  - CBC W Differential W Platelet [E]  Patient's chronic conditions are controlled.  No prior history of complications with anesthesia.  EKG is unchanged from prior.  Sleep apnea screening is moderate risk but is due to treated hypertension, age and male sex.  Clinically patient has no clear symptoms of sleep apnea except occasional snoring.  Patient is acceptable risk to proceed with surgery.  EKG is stable.  If CBC has no significant abnormalities then patient is medically cleared for procedure.    Mary Harry DO, 07/01/24, 9:51 AM    Addendum:  CBC is stable.  EKG is stable.  Patient may proceed with surgery.  He is medically cleared.  Mary Harry DO, 07/03/24, 5:39 PM

## 2024-07-01 NOTE — PROGRESS NOTES
Patient came in for draw of ordered non fasting labs. Patient drawn out of left AC, x 1 attempt and tolerated well.  lavender tube drawn.

## 2024-07-02 ENCOUNTER — TELEPHONE (OUTPATIENT)
Dept: FAMILY MEDICINE CLINIC | Facility: CLINIC | Age: 69
End: 2024-07-02

## 2024-07-02 DIAGNOSIS — Z20.822 COUGH WITH EXPOSURE TO COVID-19 VIRUS: Primary | ICD-10-CM

## 2024-07-02 DIAGNOSIS — R05.8 COUGH WITH EXPOSURE TO COVID-19 VIRUS: Primary | ICD-10-CM

## 2024-07-02 NOTE — TELEPHONE ENCOUNTER
Per PCP patient can complete E-visit questionnaire.  can the address patient Rx request.     Outreach call to notify patient, no answer left voicemail to call back

## 2024-07-02 NOTE — TELEPHONE ENCOUNTER
Pt called office stating that someone tested positive with covid and he is now experiencing mild symptoms. Pt states that he has not tested yet and will wait until tomorrow and wants to know if medication can be sent for him since he has a wedding this weekend and surgery next in case he tests positive.

## 2024-07-02 NOTE — TELEPHONE ENCOUNTER
Spoke to pt, states he has a family member at home who is positive for Covid. Pt started to develop symptoms of a sore throat and nasal drainage yesterday. Has not tested for Covid at this time, was planning to test tomorrow on day 2 of symptoms.     Patient is requesting Rx of paxlovid, is concerned if not well before retinal surgery, surgery may be canceled.     Since patient already has symptoms advised to test for covid today as opposed to waiting. Depending on test results recommended office visit. Pt would like request made to PCP.     States in the past he had covid and paxlovid Rx was given to him and he did well. Patient will test now and communicate test results to office via call or my-chart

## 2024-07-03 ENCOUNTER — TELEPHONE (OUTPATIENT)
Dept: FAMILY MEDICINE CLINIC | Facility: CLINIC | Age: 69
End: 2024-07-03

## 2024-07-03 RX ORDER — NIRMATRELVIR AND RITONAVIR 300-100 MG
KIT ORAL
Qty: 30 TABLET | Refills: 0 | Status: SHIPPED | OUTPATIENT
Start: 2024-07-03

## 2024-07-03 NOTE — TELEPHONE ENCOUNTER
Vaughn Kenny is a 69 year old male submitting an E-Visit questionnaire:  Chief Complaint   Patient presents with    Sick Call       HPI     Mychart E-Visit Sinus 1       Question 7/2/2024  6:47 PM CDT - Filed by Patient    Which of the following have you been experiencing? Congested nose    Have you had any of the following? None of the above (Difficulty: Swallowing/Breathing/Seeing: Blurry/Double)    Has the patient tested for COVID-19 since the on-set of these symptoms? Yes    If yes, what was the test result?  Negative result    Enter the date that the COVID-19 test was taken: 7/2/2024    Has the patient taken any over the counter medicines or home remedies to help the symptoms?  No    If yes, please list all of the medications and home remedies the patient has tried.        Is the patient up to date on their COVID-19 booster shot / vaccines?  Yes    Has the patient been exposed to anyone with Covid within the last week? Yes    Are your symptoms improving, worsening, or unchanged since onset? About the same              HISTORY REVIEWED   Yes, reviewed  ASSESSMENT AND PLAN   No orders of the defined types were placed in this encounter.    Encounter Diagnosis   Name Primary?    Cough with exposure to COVID-19 virus Yes     Meds & Refills for this Visit:  Requested Prescriptions     Signed Prescriptions Disp Refills    nirmatrelvir-ritonavir (PAXLOVID, 300/100,) 300-100 MG Oral Tablet Therapy Pack 30 tablet 0     Sig: Take two nirmatrelvir tablets (300mg) with one ritonavir tablet (100mg) together twice daily for 5 days.  Hold simvastatin while taking medication     Authorizing Provider: STEPHANY LALEN       TIME AND CHARGES      submitted

## 2024-07-03 NOTE — TELEPHONE ENCOUNTER
Patient is medically cleared to proceed with surgery.  Please fax his preop.  H&P has been addended.

## 2024-07-09 ENCOUNTER — ANESTHESIA (OUTPATIENT)
Dept: SURGERY | Facility: HOSPITAL | Age: 69
End: 2024-07-09
Payer: COMMERCIAL

## 2024-07-09 ENCOUNTER — ANESTHESIA EVENT (OUTPATIENT)
Dept: SURGERY | Facility: HOSPITAL | Age: 69
End: 2024-07-09
Payer: COMMERCIAL

## 2024-07-09 ENCOUNTER — HOSPITAL ENCOUNTER (OUTPATIENT)
Facility: HOSPITAL | Age: 69
Setting detail: HOSPITAL OUTPATIENT SURGERY
Discharge: HOME | End: 2024-07-09
Attending: OPHTHALMOLOGY | Admitting: OPHTHALMOLOGY
Payer: COMMERCIAL

## 2024-07-09 VITALS
SYSTOLIC BLOOD PRESSURE: 124 MMHG | DIASTOLIC BLOOD PRESSURE: 89 MMHG | HEART RATE: 73 BPM | HEIGHT: 71 IN | WEIGHT: 204 LBS | OXYGEN SATURATION: 98 % | TEMPERATURE: 98 F | BODY MASS INDEX: 28.56 KG/M2 | RESPIRATION RATE: 16 BRPM

## 2024-07-09 PROCEDURE — 08QF3ZZ REPAIR LEFT RETINA, PERCUTANEOUS APPROACH: ICD-10-PCS | Performed by: OPHTHALMOLOGY

## 2024-07-09 RX ORDER — ACETAMINOPHEN 500 MG
1000 TABLET ORAL ONCE
Status: COMPLETED | OUTPATIENT
Start: 2024-07-09 | End: 2024-07-09

## 2024-07-09 RX ORDER — NALOXONE HYDROCHLORIDE 0.4 MG/ML
0.08 INJECTION, SOLUTION INTRAMUSCULAR; INTRAVENOUS; SUBCUTANEOUS ONCE AS NEEDED
Status: CANCELLED | OUTPATIENT
Start: 2024-07-09 | End: 2024-07-09

## 2024-07-09 RX ORDER — PHENYLEPHRINE HYDROCHLORIDE 25 MG/ML
2 SOLUTION/ DROPS OPHTHALMIC AS NEEDED
Status: DISCONTINUED | OUTPATIENT
Start: 2024-07-09 | End: 2024-07-09 | Stop reason: HOSPADM

## 2024-07-09 RX ORDER — GENTAMICIN SULFATE 3 MG/ML
2 SOLUTION/ DROPS OPHTHALMIC AS NEEDED
Status: DISCONTINUED | OUTPATIENT
Start: 2024-07-09 | End: 2024-07-09 | Stop reason: HOSPADM

## 2024-07-09 RX ORDER — SODIUM CHLORIDE, SODIUM LACTATE, POTASSIUM CHLORIDE, CALCIUM CHLORIDE 600; 310; 30; 20 MG/100ML; MG/100ML; MG/100ML; MG/100ML
INJECTION, SOLUTION INTRAVENOUS CONTINUOUS
Status: DISCONTINUED | OUTPATIENT
Start: 2024-07-09 | End: 2024-07-09

## 2024-07-09 RX ORDER — BALANCED SALT SOLUTION 6.4; .75; .48; .3; 3.9; 1.7 MG/ML; MG/ML; MG/ML; MG/ML; MG/ML; MG/ML
SOLUTION OPHTHALMIC AS NEEDED
Status: DISCONTINUED | OUTPATIENT
Start: 2024-07-09 | End: 2024-07-09 | Stop reason: HOSPADM

## 2024-07-09 RX ORDER — SODIUM CHLORIDE, SODIUM LACTATE, POTASSIUM CHLORIDE, CALCIUM CHLORIDE 600; 310; 30; 20 MG/100ML; MG/100ML; MG/100ML; MG/100ML
INJECTION, SOLUTION INTRAVENOUS CONTINUOUS
Status: CANCELLED | OUTPATIENT
Start: 2024-07-09

## 2024-07-09 RX ORDER — ATROPINE SULFATE 10 MG/ML
SOLUTION/ DROPS OPHTHALMIC AS NEEDED
Status: DISCONTINUED | OUTPATIENT
Start: 2024-07-09 | End: 2024-07-09 | Stop reason: HOSPADM

## 2024-07-09 RX ORDER — NALOXONE HYDROCHLORIDE 0.4 MG/ML
0.08 INJECTION, SOLUTION INTRAMUSCULAR; INTRAVENOUS; SUBCUTANEOUS AS NEEDED
Status: DISCONTINUED | OUTPATIENT
Start: 2024-07-09 | End: 2024-07-09

## 2024-07-09 RX ORDER — ATROPINE SULFATE 10 MG/ML
1 SOLUTION/ DROPS OPHTHALMIC AS NEEDED
Status: DISCONTINUED | OUTPATIENT
Start: 2024-07-09 | End: 2024-07-09 | Stop reason: HOSPADM

## 2024-07-09 RX ADMIN — SODIUM CHLORIDE, SODIUM LACTATE, POTASSIUM CHLORIDE, CALCIUM CHLORIDE: 600; 310; 30; 20 INJECTION, SOLUTION INTRAVENOUS at 08:10:00

## 2024-07-09 NOTE — BRIEF OP NOTE
Pre-Operative Diagnosis: Retinal detachment, left eye     Post-Operative Diagnosis: Retinal detachment, left eye      Procedure Performed:   Pars plana vitrectomy, retinotomy, gas fluid exchange, laser, C3F8 gas bubble, left eye    Surgeons and Role:     * Tyree Chun MD - Primary    Assistant(s):  None      Surgical Findings: Retinal Detachment Left Eye     Specimen: None     Estimated Blood Loss: Less than 1 ml    Dictation Number:      Tyree Chun MD, MD  7/9/2024  8:16 AM

## 2024-07-09 NOTE — INTERVAL H&P NOTE
Pre-op Diagnosis: Retinal detachment, left eye    The above referenced H&P was reviewed by Tyree Chun MD, MD on 7/9/2024, the patient was examined and no significant changes have occurred in the patient's condition since the H&P was performed.  I discussed with the patient and/or legal representative the potential benefits, risks and side effects of this procedure; the likelihood of the patient achieving goals; and potential problems that might occur during recuperation.  I discussed reasonable alternatives to the procedure, including risks, benefits and side effects related to the alternatives and risks related to not receiving this procedure.  We will proceed with procedure as planned.

## 2024-07-09 NOTE — ANESTHESIA POSTPROCEDURE EVALUATION
Patient: Vaughn Kenny    Procedure Summary       Date: 07/09/24 Room / Location: [facility] MAIN OR 03 / [facility] MAIN OR    Anesthesia Start: 0734 Anesthesia Stop: 0819    Procedure: Pars plana vitrectomy, retinotomy, gas fluid exchange, laser, C3F8 gas bubble, left eye (Left: Eye) Diagnosis: (Retinal detachment, left eye)    Surgeons: Tyree Chun MD Anesthesiologist: Lee Khan MD    Anesthesia Type: MAC ASA Status: 3            Anesthesia Type: MAC    Vitals Value Taken Time   /89 07/09/24 0858   Temp 98.4 °F (36.9 °C) 07/09/24 0848   Pulse 73 07/09/24 0858   Resp 16 07/09/24 0858   SpO2 98 % 07/09/24 0858       [facility] AN Post Evaluation:   Patient Evaluated in PACU  Patient Participation: complete - patient participated  Level of Consciousness: awake and alert  Pain Management: adequate  Airway Patency:patent  Dental exam unchanged from preop  Yes    Nausea/Vomiting: none  Cardiovascular Status: hemodynamically stable  Respiratory Status: room air  Postoperative Hydration euvolemic      Lee Khan MD  7/9/2024 10:21 AM

## 2024-07-09 NOTE — OPERATIVE REPORT
[facility]    PATIENT'S NAME: JAMES MCARTHUR   ATTENDING PHYSICIAN: Tyree Chun MD   OPERATING PHYSICIAN: Tyree Chun MD   PATIENT ACCOUNT#:   [id number]    LOCATION:  [facility] 10  MEDICAL RECORD #:   [id number]       YOB: 1955  ADMISSION DATE:       07/09/2024      OPERATION DATE:  07/09/2024    OPERATIVE REPORT      PREOPERATIVE DIAGNOSIS:  Retinal detachment, left eye.  POSTOPERATIVE DIAGNOSIS:  Retinal detachment, left eye.  PROCEDURE:  Pars plana vitrectomy, retinotomy, gas-fluid exchange, laser photocoagulation, and injection of a 13% mixture of filtered perfluoropropane gas, left eye.    ANESTHESIA:  IV sedation with retrobulbar and Leonard lid blocks, left eye.    COMPLICATIONS:  None.    INDICATIONS:  The patient is a 69-year-old gentleman with a history of retinal detachment in each eye for which he has undergone prior vitrectomy surgeries.  He noted approximately 1 week ago a shadow over the superior visual field of his left eye.  That examination revealed a visual acuity of 20/200 right eye and 20/40 left eye.  Pinhole improves the vision of the right eye to 20/50 and the left eye to 20/30.  External and extraocular motility examinations were normal.  Slit lamp examination of each eye revealed anterior stromal corneal scarring inferiorly.  There was a well-positioned posterior chamber intraocular lens with an open posterior capsule.  His intraocular pressures measured 15 right eye and 14 left eye.  Dilated fundus examination of the right eye revealed a cup-to-disc ratio 0.6.  The optic cup extended to the superotemporal disc rim.  The retinal arterioles were normal.  The macula was normal.  The retina was attached for 360 degrees.  Preretinal membranes could be seen inferotemporally.  Laser photocoagulation scarring could be seen throughout the retinal periphery.  There were no open retinal tears, holes, or breaks identified for 360 degrees.  The  vitreous cavity was clear following prior vitrectomy surgery.  Dilated fundus examination of the left eye revealed a cup-to-disc ratio 0.4.  The retinal arterioles were normal.  The macula was normal.  Well-treated retinal tears could be seen superonasally and superotemporally as well as inferotemporally.  An equivocal atrophic retinal hole was seen inferiorly.  There was a retinal detachment extending from the 4 to 7 o'clock positions peripherally.  The vitreous cavity was clear.  After a thorough discussion of the risks, benefits, and alternative treatment options including the options of scleral buckling surgery, the patient wished to proceed with repeat pars plana vitrectomy for his left eye.  The risks of surgery discussed included, but were not limited to, pain, infection, bleeding, redness, loss of vision, loss of the eye, need for further surgery, retinal detachment, glaucoma, corneal decompensation, ptosis, diplopia, crossing of the eyes, scarring, swelling, optic neuropathy, as well as anesthetic-related risks such as death, myocardial infarction, stroke, pulmonary embolism, etc.  After careful consideration of the above discussion, the patient wished to proceed with the planned surgery for his left eye.  He did, however, elect to defer surgery for a brief time due to the patient's daughter's wedding this past weekend.  The crease risk of progressive vision loss was discussed in detail with the patient and his wife.  He did report that over the weekend he lost his central vision.    OPERATIVE TECHNIQUE:  The patient was brought to the operating room where IV sedation was induced without difficulty by the Department of Anesthesiology.  A retrobulbar injection of a 50/50 mixture of 2% lidocaine and 0.75% Marcaine was given to the retrobulbar space of the left eye using a flat grind, 25-gauge needle.  A total of 4 mL of this anesthetic was delivered to the left retrobulbar space.  An additional 8 mL of the  same anesthetic was given to the left preauricular region in a modified Leonard lid block fashion.  After determining adequate anesthesia and akinesia, the left eye was prepped and draped in usual sterile manner.  A heavy wire lid speculum was placed between the lids of the left eye.  A caliper was used to measure 3.5 mm posterior to the corneoscleral limbus in the inferotemporal quadrant.  A 25-gauge trocar was then placed in a beveled fashion after appropriate displacement of the conjunctiva through this 3.5 mm shiv.  The trocar was directed into the vitreous cavity and connected to a 25-gauge infusion cannula.  The position of the infusion cannula was identified within the vitreous cavity and found to be free of retinal tissue.  The infusion was turned on.  Two additional 25-gauge trocars were then placed, 1 in the superotemporal and 1 in the superonasal quadrant.  Each of these trocars were placed 3.5 mm posterior to the corneoscleral limbus in a beveled fashion after appropriate displacement of the conjunctiva.  Viscoat was placed over the cornea of the left eye.  The microscope was positioned over the cornea of the left eye using the binocular indirect operating microscope attachment.  A light pipe was inserted into the superotemporal sclerotomy site and a Microvit into the superonasal sclerotomy site.  With suction set between 50 and 600 mmHg and cutting set at 20,000 cuts per minute, an anterior vitrectomy was performed.  Most of the vitreous had been previously removed.  There was a moderate vitreous skirt inferiorly which was trimmed using the Microvit.  A couplet of atrophic-appearing retinal holes was seen inferiorly at the 6 o'clock position.  All vitreous traction was released from these tears.  There were no additional retinal tears, holes, or breaks identified.  Following removal of the vitreous, the Microvit was removed and a cautery probe placed through the superonasal sclerotomy site.  Cautery  was used to shiv the inferior retinal breaks.  The cautery was then used to create a posterior retinotomy nasally.  The cautery probe was removed and a soft tip needle and suction was placed through the superonasal sclerotomy site.  An air-fluid exchange was performed to the level of the retina.  The subretinal fluid was drained through the posterior retinotomy.  Approximately 10 minutes were allowed to elapse for further settling of the vitreous fluid posteriorly.  This was then removed using the soft tip needle and suction.  The soft tip needle was removed and an Endolaser probe placed through the superonasal sclerotomy site.  Laser photocoagulation was then applied in several concentric rows surrounding the posterior retinotomy as well as surrounding the inferior retinal breaks and an area of chorioretinal scarring inferotemporally.  The parameters used for the treatment included 300 to 400 mW of energy and 0.2 seconds timing.  Approximately 150 spots of 532 nanometer laser energy applications were made.  The instruments were removed.  The smokestack was inserted into the superonasal sclerotomy site.  The infusion cannula was then connected to a 60 mL syringe filled with a 13% mixture of filtered perfluoropropane gas.  Approximately 50 mL of the gas was irrigated through the vitreous cavity with air being allowed to escape through the smokestack.  The superior trocars were then removed and a cotton-tipped applicator was placed over each of the superior sclerotomy sites for a period of approximately 10 seconds.  The wounds were checked and found to be airtight.  The infusion cannula and inferotemporal trocar were removed and a cotton-tipped applicator was placed over the inferotemporal sclerotomy site for a period of approximately 10 seconds.  The wound was checked and found to be airtight.  The intraocular pressures were checked and felt to be appropriate.  Atropine 1% drops were placed over the cornea of the  left eye.  TobraDex ointment was placed over the cornea of the left eye.  The lid speculum was  removed and the eyelids closed.  An eye patch and Shafer shield were placed over the left eye.  The patient was awakened from anesthesia and brought to the recovery room in excellent condition after having tolerated the procedure well.  There were no complications.     Dictated By Tyree Chun MD  d: 07/09/2024 08:27:10  t: 07/09/2024 16:03:01  [facility] [id number]  ABW/

## 2024-07-09 NOTE — DISCHARGE INSTRUCTIONS
Bring eye drops and dressings to your appointment tomorrow. Follow instructions given by Dr. Chun     Discharge Instructions: After Your Surgery  You’ve just had surgery. During surgery, you were given medicine called anesthesia to keep you relaxed and free of pain. After surgery, you may have some pain or nausea. This is common. Here are some tips for feeling better and getting well after surgery.   Going home  Your healthcare provider will show you how to take care of yourself when you go home. They'll also answer your questions. Have an adult family member or friend drive you home. For the first 24 hours after your surgery:   Don't drive or use heavy equipment.  Don't make important decisions or sign legal papers.  Take medicines as directed.  Don't drink alcohol.  Have someone stay with you, if needed. They can watch for problems and help keep you safe.  Be sure to go to all follow-up visits with your healthcare provider. And rest after your surgery for as long as your provider tells you to.   Coping with pain  If you have pain after surgery, pain medicine will help you feel better. Take it as directed, before pain becomes severe. Also, ask your healthcare provider or pharmacist about other ways to control pain. This might be with heat, ice, or relaxation. And follow any other instructions your surgeon or nurse gives you.      Stay on schedule with your medicine.     Tips for taking pain medicine  To get the best relief possible, remember these points:   Pain medicines can upset your stomach. Taking them with a little food may help.  Most pain relievers taken by mouth need at least 20 to 30 minutes to start to work.  Don't wait till your pain becomes severe before you take your medicine. Try to time your medicine so that you can take it before starting an activity. This might be before you get dressed, go for a walk, or sit down for dinner.  Constipation is a common side effect of some pain medicines. Call  your healthcare provider before taking any medicines such as laxatives or stool softeners to help ease constipation. Also ask if you should skip any foods. Drinking lots of fluids and eating foods such as fruits and vegetables that are high in fiber can also help. Remember, don't take laxatives unless your surgeon has prescribed them.  Drinking alcohol and taking pain medicine can cause dizziness and slow your breathing. It can even be deadly. Don't drink alcohol while taking pain medicine.  Pain medicine can make you react more slowly to things. Don't drive or run machinery while taking pain medicine.  Your healthcare provider may tell you to take acetaminophen to help ease your pain. Ask them how much you're supposed to take each day. Acetaminophen or other pain relievers may interact with your prescription medicines or other over-the-counter (OTC) medicines. Some prescription medicines have acetaminophen and other ingredients in them. Using both prescription and OTC acetaminophen for pain can cause you to accidentally overdose. Read the labels on your OTC medicines with care. This will help you to clearly know the list of ingredients, how much to take, and any warnings. It may also help you not take too much acetaminophen. If you have questions or don't understand the information, ask your pharmacist or healthcare provider to explain it to you before you take the OTC medicine.   Managing nausea  Some people have an upset stomach (nausea) after surgery. This is often because of anesthesia, pain, or pain medicine, less movement of food in the stomach, or the stress of surgery. These tips will help you handle nausea and eat healthy foods as you get better. If you were on a special food plan before surgery, ask your healthcare provider if you should follow it while you get better. Check with your provider on how your eating should progress. It may depend on the surgery you had. These general tips may help:   Don't  push yourself to eat. Your body will tell you when to eat and how much.  Start off with clear liquids and soup. They're easier to digest.  Next try semi-solid foods as you feel ready. These include mashed potatoes, applesauce, and gelatin.  Slowly move to solid foods. Don’t eat fatty, rich, or spicy foods at first.  Don't force yourself to have 3 large meals a day. Instead eat smaller amounts more often.  Take pain medicines with a small amount of solid food, such as crackers or toast. This helps prevent nausea.  When to call your healthcare provider  Call your healthcare provider right away if you have any of these:   You still have too much pain, or the pain gets worse, after taking the medicine. The medicine may not be strong enough. Or there may be a complication from the surgery.  You feel too sleepy, dizzy, or groggy. The medicine may be too strong.  Side effects such as nausea or vomiting. Your healthcare provider may advise taking other medicines to .  Skin changes such as rash, itching, or hives. This may mean you have an allergic reaction. Your provider may advise taking other medicines.  The incision looks different (for instance, part of it opens up).  Bleeding or fluid leaking from the incision site, and weren't told to expect that.  Fever of 100.4°F (38°C) or higher, or as directed by your provider.  Call 911  Call 911 right away if you have:   Trouble breathing  Facial swelling    If you have obstructive sleep apnea   You were given anesthesia medicine during surgery to keep you comfortable and free of pain. After surgery, you may have more apnea spells because of this medicine and other medicines you were given. The spells may last longer than normal.    At home:  Keep using the continuous positive airway pressure (CPAP) device when you sleep. Unless your healthcare provider tells you not to, use it when you sleep, day or night. CPAP is a common device used to treat obstructive sleep apnea.  Talk  with your provider before taking any pain medicine, muscle relaxants, or sedatives. Your provider will tell you about the possible dangers of taking these medicines.  Contact your provider if your sleeping changes a lot even when taking medicines as directed.

## 2024-07-09 NOTE — ANESTHESIA PREPROCEDURE EVALUATION
Anesthesia PreOp Note    HPI:     Vaughn Kenny is a 69 year old male who presents for preoperative consultation requested by: Tyree Chun MD    Date of Surgery: 7/9/2024    Procedure(s):  Pars plana vitrectomy, retinotomy, gas fluid exchange, laser, left eye  Indication: Retinal detachment, left eye    Relevant Problems   No relevant active problems       NPO:  Last Liquid Consumption Date: 07/08/24  Last Liquid Consumption Time: 1830  Last Solid Consumption Date: 07/08/24  Last Solid Consumption Time: 1830  Last Liquid Consumption Date: 07/08/24          History Review:  Patient Active Problem List    Diagnosis Date Noted    Preop examination 07/01/2024    Retinal tear of left eye 07/01/2024    GERD (gastroesophageal reflux disease) 06/05/2023    Hardy's esophagus without dysplasia 06/05/2023    Iron deficiency anemia due to chronic blood loss 02/24/2023    Family history of colon cancer 02/24/2023    Chronic heartburn 02/24/2023    Diverticulosis of large intestine without perforation or abscess without bleeding 02/24/2023    Normocytic anemia due to blood loss 02/15/2023    BMI 28.0-28.9,adult 11/30/2021    B12 deficiency 11/20/2020    Ascending aortic aneurysm (HCC) 05/18/2018    Dilatation of thoracic aorta (HCC) 12/12/2017    Hardy's esophagus determined by endoscopy 06/29/2012    Diaphragmatic hernia without mention of obstruction or gangrene 06/29/2012    Essential hypertension 06/29/2012    Mixed hyperlipidemia 06/29/2012       Past Medical History:    Abdominal hernia    Hiatal    Allergic rhinitis    Minimal    Alopecia, unspecified    Arthritis    Consulted podiatrist 2017; Also bicep tendonitis    Atherosclerosis of coronary artery    Aortic aneurism; calcium score 90    Hardy's esophagus    x2    Change in hair    Anemia causing flaking nails    Decorative tattoo    Disorder of prostate    BPH    Esophageal reflux    Eye disease    Vitrectomies    Frequent urination    Urologist next  week    Heartburn    3 year upper GI recalls at .    Hernia of unspecified site of abdominal cavity without mention of obstruction or gangrene    High blood pressure    High cholesterol    Indigestion    Resolved with Omeprazole    Lipid screening    Normocytic anemia due to blood loss    Other and unspecified hyperlipidemia    Unspecified essential hypertension    Visual impairment    reading glasses    Wears glasses       Past Surgical History:   Procedure Laterality Date    Cataract      Cholecystectomy      Colonoscopy  2008    Colonoscopy  2019    10-year recall    Egd  2023    Other surgical history      Upper Gastrointestinal Endoscopy    Other surgical history      gallbladder surgery    Other surgical history      Uvulectomy    Tonsillectomy  1960    Vasectomy         Medications Prior to Admission   Medication Sig Dispense Refill Last Dose    Turmeric Curcumin 500 MG Oral Cap Take 1 capsule by mouth daily.   7/2/2024    simvastatin 40 MG Oral Tab Take 1 tablet (40 mg total) by mouth nightly. 90 tablet 1 7/8/2024    losartan 50 MG Oral Tab Take 1 tablet (50 mg total) by mouth daily. 90 tablet 1 7/8/2024 at 0800    OMEPRAZOLE 20 MG Oral Capsule Delayed Release TAKE ONE CAPSULE BY MOUTH EVERY MORNING (Patient taking differently: Take 1 capsule (20 mg total) by mouth every morning.) 90 capsule 2 7/9/2024    Bettina 500 MG Oral Cap Take 1 capsule by mouth daily.   7/2/2024    Cyanocobalamin (B-12) 1000 MCG Oral Cap Take 1 capsule by mouth daily.   7/7/2024    loratadine 10 MG Oral Tab Take 1 tablet (10 mg total) by mouth daily.   7/8/2024    Calcium Glycerophosphate (PRELIEF) 340 (65-50) MG (CA-P) Oral Tab Take 1 tablet by mouth daily.   7/8/2024    fluorometholone 0.1 % Ophthalmic Suspension Place 1 drop into both eyes daily.   7/9/2024    Lutein 40 MG Oral Cap Take 1 capsule by mouth daily.   7/8/2024    MAGNESIUM MALATE OR Take 2 g by mouth daily.   7/7/2024    Cholecalciferol (VITAMIN D-3) 1000 units Oral  Cap Take 1 capsule by mouth daily.   7/7/2024     Current Facility-Administered Medications Ordered in Epic   Medication Dose Route Frequency Provider Last Rate Last Admin    lactated ringers infusion   Intravenous Continuous Tyree Chun MD 20 mL/hr at 07/09/24 0614 New Bag at 07/09/24 0614    atropine (Atropine-Care) 1 % ophthalmic solution 1 drop  1 drop Left Eye PRN Tyree Chun MD   1 drop at 07/09/24 0615    phenylephrine (Mydfrin) 2.5 % ophthalmic solution 2 drop  2 drop Left Eye PRN Tyree Chun MD   2 drop at 07/09/24 0633    gentamicin (Garamycin) 0.3 % ophthalmic solution 2 drop  2 drop Left Eye PRN Tyree Chun MD   2 drop at 07/09/24 0633     No current [facility]-ordered outpatient medications on file.       No Known Allergies    Family History   Problem Relation Age of Onset    Cancer Father         Stomach    Hypertension Father     Other (gout) Father     Lipids Father         Hyperlipidemia    Obesity Father     Other (hepatitis) Mother         blood transfusion    No Known Problems Daughter     No Known Problems Maternal Grandmother     No Known Problems Maternal Grandfather     No Known Problems Paternal Grandmother     Other (athersclerosis) Paternal Grandfather     Cancer Brother     No Known Problems Daughter      Social History     Socioeconomic History    Marital status:    Tobacco Use    Smoking status: Never    Smokeless tobacco: Never   Vaping Use    Vaping status: Never Used   Substance and Sexual Activity    Alcohol use: Yes     Comment: 1-2 drinks daily    Drug use: Yes     Types: Cannabis     Comment: occasional    Sexual activity: Yes     Partners: Female   Other Topics Concern    Caffeine Concern Yes    Stress Concern No    Weight Concern No    Special Diet No    Exercise Yes    Seat Belt Yes       Available pre-op labs reviewed.  Lab Results   Component Value Date    WBC 5.5 07/01/2024    RBC 4.84 07/01/2024    HGB 16.1 07/01/2024    HCT 46.6 07/01/2024     MCV 96.3 07/01/2024    MCH 33.3 07/01/2024    MCHC 34.5 07/01/2024    RDW 11.9 07/01/2024    .0 07/01/2024     Lab Results   Component Value Date     04/12/2024    K 4.5 04/12/2024     04/12/2024    CO2 26.0 04/12/2024    BUN 14 04/12/2024    CREATSERUM 1.17 04/12/2024    GLU 99 04/12/2024    CA 9.4 04/12/2024          Vital Signs:  Body mass index is 28.45 kg/m².   height is 1.803 m (5' 11\") and weight is 92.5 kg (204 lb). His oral temperature is 98.7 °F (37.1 °C). His blood pressure is 129/78 and his pulse is 74. His respiration is 20 and oxygen saturation is 94%.   Vitals:    07/03/24 0906 07/09/24 0547   BP:  129/78   Pulse:  74   Resp:  20   Temp:  98.7 °F (37.1 °C)   TempSrc:  Oral   SpO2:  94%   Weight: 93 kg (205 lb) 92.5 kg (204 lb)   Height: 1.803 m (5' 11\") 1.803 m (5' 11\")        Anesthesia Evaluation      No history of anesthetic complications   Airway   Mallampati: II  TM distance: >3 FB  Neck ROM: full  Dental      Pulmonary     breath sounds clear to auscultation  (-) COPD, asthma, sleep apnea, decreased breath sounds, wheezes  Cardiovascular   Exercise tolerance: good  (+) hypertension, CAD  (-) pacemaker, murmur    Rhythm: regular  Rate: normal    Neuro/Psych    (+)  neuromuscular disease,      (-) seizures    GI/Hepatic/Renal    (+) GERD    Endo/Other    (-) diabetes mellitus  Abdominal                  Anesthesia Plan:   ASA:  3  Plan:   MAC  Informed Consent Plan and Risks Discussed With:  Patient      I have informed Vaughn Kenny and/or legal guardian or family member of the nature of the anesthetic plan, benefits, risks including possible dental damage if relevant, major complications, and any alternative forms of anesthetic management.   All of the patient's questions were answered to the best of my ability. The patient desires the anesthetic management as planned.  Lee Khan MD  7/9/2024 7:04 AM  Present on Admission:  **None**

## 2024-10-08 ENCOUNTER — ORDER TRANSCRIPTION (OUTPATIENT)
Dept: ADMINISTRATIVE | Facility: HOSPITAL | Age: 69
End: 2024-10-08

## 2024-10-08 DIAGNOSIS — I71.21 ASCENDING AORTIC ANEURYSM (HCC): ICD-10-CM

## 2024-10-08 DIAGNOSIS — I72.3 ANEURYSM ARTERY, ILIAC (HCC): Primary | ICD-10-CM

## 2024-10-21 ENCOUNTER — OFFICE VISIT (OUTPATIENT)
Dept: FAMILY MEDICINE CLINIC | Facility: CLINIC | Age: 69
End: 2024-10-21
Payer: COMMERCIAL

## 2024-10-21 ENCOUNTER — PATIENT MESSAGE (OUTPATIENT)
Dept: FAMILY MEDICINE CLINIC | Facility: CLINIC | Age: 69
End: 2024-10-21

## 2024-10-21 VITALS
TEMPERATURE: 98 F | SYSTOLIC BLOOD PRESSURE: 126 MMHG | HEART RATE: 73 BPM | DIASTOLIC BLOOD PRESSURE: 78 MMHG | HEIGHT: 71.65 IN | RESPIRATION RATE: 22 BRPM | WEIGHT: 204.81 LBS | BODY MASS INDEX: 28.05 KG/M2 | OXYGEN SATURATION: 96 %

## 2024-10-21 DIAGNOSIS — E53.8 B12 DEFICIENCY: Primary | ICD-10-CM

## 2024-10-21 DIAGNOSIS — D50.0 IRON DEFICIENCY ANEMIA DUE TO CHRONIC BLOOD LOSS: ICD-10-CM

## 2024-10-21 DIAGNOSIS — H33.312 RETINAL TEAR OF LEFT EYE: ICD-10-CM

## 2024-10-21 DIAGNOSIS — K22.70 BARRETT'S ESOPHAGUS WITHOUT DYSPLASIA: ICD-10-CM

## 2024-10-21 DIAGNOSIS — I10 ESSENTIAL HYPERTENSION: ICD-10-CM

## 2024-10-21 DIAGNOSIS — K21.9 GASTROESOPHAGEAL REFLUX DISEASE WITHOUT ESOPHAGITIS: ICD-10-CM

## 2024-10-21 DIAGNOSIS — Z13.29 SCREENING FOR ENDOCRINE, NUTRITIONAL, METABOLIC AND IMMUNITY DISORDER: ICD-10-CM

## 2024-10-21 DIAGNOSIS — D50.0 NORMOCYTIC ANEMIA DUE TO BLOOD LOSS: ICD-10-CM

## 2024-10-21 DIAGNOSIS — Z12.5 SCREENING FOR PROSTATE CANCER: ICD-10-CM

## 2024-10-21 DIAGNOSIS — Z13.0 SCREENING FOR ENDOCRINE, NUTRITIONAL, METABOLIC AND IMMUNITY DISORDER: ICD-10-CM

## 2024-10-21 DIAGNOSIS — Z00.00 ANNUAL PHYSICAL EXAM: Primary | ICD-10-CM

## 2024-10-21 DIAGNOSIS — Z13.21 SCREENING FOR ENDOCRINE, NUTRITIONAL, METABOLIC AND IMMUNITY DISORDER: ICD-10-CM

## 2024-10-21 DIAGNOSIS — Z13.228 SCREENING FOR ENDOCRINE, NUTRITIONAL, METABOLIC AND IMMUNITY DISORDER: ICD-10-CM

## 2024-10-21 DIAGNOSIS — E53.8 B12 DEFICIENCY: ICD-10-CM

## 2024-10-21 DIAGNOSIS — I71.21 ANEURYSM OF ASCENDING AORTA WITHOUT RUPTURE (HCC): ICD-10-CM

## 2024-10-21 DIAGNOSIS — E78.2 MIXED HYPERLIPIDEMIA: ICD-10-CM

## 2024-10-21 PROBLEM — Z01.818 PREOP EXAMINATION: Status: RESOLVED | Noted: 2024-07-01 | Resolved: 2024-10-21

## 2024-10-21 PROBLEM — H35.412 LATTICE DEGENERATION OF LEFT RETINA: Status: ACTIVE | Noted: 2024-10-21

## 2024-10-21 PROBLEM — R12 CHRONIC HEARTBURN: Status: RESOLVED | Noted: 2023-02-24 | Resolved: 2024-10-21

## 2024-10-21 RX ORDER — LOSARTAN POTASSIUM 50 MG/1
50 TABLET ORAL DAILY
Qty: 90 TABLET | Refills: 1 | Status: SHIPPED | OUTPATIENT
Start: 2024-10-21

## 2024-10-21 RX ORDER — SIMVASTATIN 40 MG
40 TABLET ORAL NIGHTLY
Qty: 90 TABLET | Refills: 1 | Status: SHIPPED | OUTPATIENT
Start: 2024-10-21

## 2024-10-21 NOTE — PATIENT INSTRUCTIONS
Pt declined AVS   Cryotherapy Text: The wound bed was treated with cryotherapy after the biopsy was performed. Destruction After The Procedure: No Consent: Written consent was obtained and risks were reviewed including but not limited to scarring, infection, bleeding, scabbing, incomplete removal, nerve damage and allergy to anesthesia. Lab: [id number] Detail Level: Detailed Biopsy Method: Dermablade Curettage Text: The wound bed was treated with curettage after the biopsy was performed. Notification Instructions: Patient will be notified of biopsy results. However, patient instructed to call the office if not contacted within 2 weeks. Hemostasis: Drysol Was A Bandage Applied: Yes Silver Nitrate Text: The wound bed was treated with silver nitrate after the biopsy was performed. Post-Care Instructions: I reviewed with the patient in detail post-care instructions. Patient is to keep the biopsy site dry overnight, and then apply bacitracin twice daily until healed. Patient may apply hydrogen peroxide soaks to remove any crusting. Dressing: pressure dressing Anesthesia Volume In Cc (Will Not Render If 0): 0.5 Biopsy Type: H and E Electrodesiccation And Curettage Text: The wound bed was treated with electrodesiccation and curettage after the biopsy was performed. Billing Type: Third-Party Bill Type Of Destruction Used: Curettage X Size Of Lesion In Cm: 0 Depth Of Biopsy: dermis Path Notes (To The Dermatopathologist): Please check margins. Us derm - JA Electrodesiccation Text: The wound bed was treated with electrodesiccation after the biopsy was performed. Wound Care: Polysporin ointment Anesthesia Type: 1% lidocaine with epinephrine Lab Facility: [id number]

## 2024-10-21 NOTE — TELEPHONE ENCOUNTER
LMOM to return call to the office. Provided pt office phone [phone] along with office hours.        Dr Harry has review patient lab results, and indicated MCV is elevated, recommends patient to get b-12 blood draw lab order is placed.

## 2024-10-21 NOTE — PROGRESS NOTES
Chief Complaint   Patient presents with    Physical     Annual exam      REASON FOR VISIT:    Vaughn Kenny is a 69 year old male who presents for an Annual Health Assessment.    Completed labs at work screening.    Hx of left eye pars plana vitrectomy, secondary to left retinal tear to on 7/9/2024.   Diagnosed with latice degeneration of left retina. History of left eye lower visual field disturbance. Hx of prior vitrectomy due to retinal in left eye.     WALTER due to blood donation.  Completed consult with Dr. Loredo-hematologist 8/2023-recommended starting iron patient has been taking and had repeat CBC only on 10/4/2023 at health screening work labs.  Stopped iron 10/2023 under recommendation of hematologist.  Not donating blood.  Had extensive GI work-up upper or lower endoscopy and capsule study in 2023.  Has history of Hardy's esophagus.  Hematologist in consult attributes WALTER to blood donation.  GI recommends if patient would like to donate blood needs to be on appropriate iron replacement.  For now patient is declining blood donation.  Off iron.  CBC and iron studies q. 6 months. NL cbc on work health screen 10/2024    Patient presents for recheck of hypertension. Pt has been taking medications as instructed, no medication side effects, home BP monitoring in the range of 110-120's systolic and 70-80's diastolic.  Denies headache, dizziness, chest pain, shortness of breath, leg swelling    Pt presents for recheck of hyperlipidemia. Patient reports taking medications as instructed, no medication side effects noted. Denies any generalized muscle aches.  Performed health screening labs-10/4/2023-,LDL 87 HDL41   Denies any headache, dizziness, chest pain, shortness of breath, bleeding issues, leg swelling.  Bikes 100 miles a week.  Very active    Aortic aneurysm- widened root - followed by Dr.Ann Cruz. denies chest pain or sob- feels well.  States had several imaging and monitoring with  no change and cardiologist suspects widened root is genetic.  Patient will have annual echocardiogram of the aortic aneurysm.  Stable x 15 years may be congenital.  Denies any chest pain or shortness of breath or exercise intolerance     H/0 Rectus diastasis- x years.  No abdominal pain. ADL and bike riding not affected.     , monogamous  On vit D daily -1000   MVI- no  Calcium- not monitoring  Last PSA-2.1- 10/2024 denies nocturia, urinary urgency or hesistancy or hematuria. Overactive bladder seen urologist recommended bladder.  Colonoscopy- 2/24/2023-recall 10 years-performed early due to WALTER                           9/11/2019-hyperplastic polyp repeat 10 years, EGD 3 year recall with olivia's esophagus at [facility]  Exercise - biking2-5x weekly 45 mins-2.5 hours  Diet-low carb, no sugar, low sodium 1500, veg 3 or more servings  Dentist regularly-yes  Annual eye exam- yes  Etoh: 2 drinks-  wine 1-2 glass per night  Cigs: never, no vaping, occasional cannibus  Immunizations: completed covid 19 including booster, flu done  FH significant: stomach cancer in dad, mom blood transfusion hep c- non A and non B  Family History   Problem Relation Age of Onset    Cancer Father         Stomach    Hypertension Father     Other (gout) Father     Lipids Father         Hyperlipidemia    Obesity Father     Other (hepatitis) Mother         blood transfusion    No Known Problems Daughter     No Known Problems Maternal Grandmother     No Known Problems Maternal Grandfather     No Known Problems Paternal Grandmother     Other (athersclerosis) Paternal Grandfather     Cancer Brother     No Known Problems Daughter        Wt Readings from Last 6 Encounters:   10/21/24 204 lb 12.8 oz (92.9 kg)   07/09/24 204 lb (92.5 kg)   07/01/24 210 lb (95.3 kg)   04/23/24 209 lb (94.8 kg)   10/16/23 208 lb 6.4 oz (94.5 kg)   07/13/23 212 lb 6.4 oz (96.3 kg)         Patient Active Problem List   Diagnosis    Olivia's esophagus determined  by endoscopy    Diaphragmatic hernia without mention of obstruction or gangrene    Essential hypertension    Mixed hyperlipidemia    Ascending aortic aneurysm (HCC)    Dilatation of thoracic aorta (HCC)    B12 deficiency    BMI 28.0-28.9,adult    Normocytic anemia due to blood loss    Iron deficiency anemia due to chronic blood loss    Family history of colon cancer    Chronic heartburn    Diverticulosis of large intestine without perforation or abscess without bleeding    GERD (gastroesophageal reflux disease)    Hardy's esophagus without dysplasia    Retinal tear of left eye     Current Outpatient Medications   Medication Sig Dispense Refill    Turmeric Curcumin 500 MG Oral Cap Take 1 capsule by mouth daily.      simvastatin 40 MG Oral Tab Take 1 tablet (40 mg total) by mouth nightly. 90 tablet 1    losartan 50 MG Oral Tab Take 1 tablet (50 mg total) by mouth daily. 90 tablet 1    OMEPRAZOLE 20 MG Oral Capsule Delayed Release TAKE ONE CAPSULE BY MOUTH EVERY MORNING (Patient taking differently: Take 1 capsule (20 mg total) by mouth every morning.) 90 capsule 2    Bettina 500 MG Oral Cap Take 1 capsule by mouth daily.      Cyanocobalamin (B-12) 1000 MCG Oral Cap Take 1 capsule by mouth daily.      Calcium Glycerophosphate (PRELIEF) 340 (65-50) MG (CA-P) Oral Tab Take 1 tablet by mouth daily.      fluorometholone 0.1 % Ophthalmic Suspension Place 1 drop into both eyes daily.      Lutein 40 MG Oral Cap Take 1 capsule by mouth daily.      MAGNESIUM MALATE OR Take 2 g by mouth daily.      Cholecalciferol (VITAMIN D-3) 1000 units Oral Cap Take 1 capsule by mouth daily.       Wt Readings from Last 6 Encounters:   10/21/24 204 lb 12.8 oz (92.9 kg)   07/09/24 204 lb (92.5 kg)   07/01/24 210 lb (95.3 kg)   04/23/24 209 lb (94.8 kg)   10/16/23 208 lb 6.4 oz (94.5 kg)   07/13/23 212 lb 6.4 oz (96.3 kg)     Body mass index is 28.04 kg/m².    No results found for: \"GLUCOSE\"  Lab Results   Component Value Date    CHOLEST 156  04/12/2024    CHOLEST 121 05/23/2023    CHOLEST 150 11/12/2022     Lab Results   Component Value Date    HDL 44 04/12/2024    HDL 45 05/23/2023    HDL 42 11/12/2022     Lab Results   Component Value Date    TRIGLY 164 (H) 11/12/2022     Lab Results   Component Value Date    LDL 94 04/12/2024    LDL 58 05/23/2023    LDL 80 11/12/2022     Lab Results   Component Value Date    AST 7 (L) 04/12/2024    AST 19 05/23/2023    AST 13 11/12/2022     Lab Results   Component Value Date    ALT 19 04/12/2024    ALT 19 05/23/2023    ALT 15 11/12/2022     Lab Results   Component Value Date    TSH 1.560 05/23/2023    TSH 1.870 05/23/2022    TSH 1.760 06/23/2020     Lab Results   Component Value Date    BUN 14 04/12/2024    BUN 17 05/23/2023    BUN 14 11/12/2022    CREATSERUM 1.17 04/12/2024    CREATSERUM 1.02 05/23/2023    CREATSERUM 1.09 05/23/2022     Lab Results   Component Value Date    PSA 1.6 11/12/2022    PSA 1.5 11/13/2021       General Health     How would you describe your current health state?: Good    Type of Diet: Balanced    How do you maintain positive mental well-being?: Visiting Friends;Visiting Family    How would you describe your daily physical activity?: Heavy    If you are a male age 45-79 or a female age 55-79, do you take aspirin?: No    Have you had any immunizations at another office such as Influenza, Hepatitis B, Tetanus, or Pneumococcal?: No    At any time do you feel concerned for the safety/well-being of yourself and/or your children, in your home or elsewhere?: No     CAGE:     Cut: Have you ever felt you should Cut down on your drinking?: No    Annoyed: Have people Annoyed you by criticizing your drinking?: No    Guilty: Have you ever felt bad or Guilty about your drinking?: No    Eye Opener: Have you ever had a drink first thing in the morning to steady your nerves or to get rid of a hangover (Eye opener)?: No    Scoring  Total Score: 0     Depression Screening (PHQ-2/PHQ-9): Over the LAST 2 WEEKS    Little interest or pleasure in doing things (over the last two weeks)?: Not at all    Feeling down, depressed, or hopeless (over the last two weeks)?: Not at all    PHQ-2 SCORE: 0        PREVENTATIVE SERVICES  INDICATIONS AND SCHEDULE Recommendation Internal Lab or Procedure External Lab or Procedure   Colonoscopy Screen Every 10 years Health Maintenance   Topic Date Due    Colorectal Cancer Screening  02/24/2033       Flex Sigmoidoscopy Screen  Every 5 years No results found for this or any previous visit.    Fecal Occult Blood  Annually Occult Blood Result (no units)   Date Value   12/15/2017 Negative for Occult Blood   12/15/2017 Negative for Occult Blood   12/15/2017 Negative for Occult Blood   09/15/2015 Positive for Occult Blood (A)       Obesity Screening Screen all adults annually Body mass index is 28.04 kg/m².      Preventive Services for Which Recommendations Vary with Risk Recommendation Internal Lab or Procedure External Lab or Procedure   Cholesterol Screening Recommended screening varies with age, risk and gender LDL Cholesterol (mg/dL)   Date Value   04/12/2024 94   11/12/2022 80       Diabetes Screening  If history of high blood pressure or other  risk factors No results found for: \"A1C\"  Glucose (mg/dL)   Date Value   04/12/2024 99     GLUCOSE (mg/dL)   Date Value   03/27/2014 104 (H)   10/06/2011 85         Gonorrhea Screening If high risk No results found for: \"GONOCOCCUS\"    HIV Screening For all adults age 18-65, older adults at increased risk No results found for: \"HIV\"    Syphilis Screening Screen if pregnant or high risk No results found for: \"RPR\"    Hepatitis C Screening Screen those at high risk plus screen one time for adults born 1946-1 965 No results found for: \"HCVAB\"    Tuberculosis Screen If high risk No components found for: \"PPDINDURAT\"      Disease Monitoring:    SPECIFIC DISEASE MONITORING Internal Lab or Procedure External Lab or Procedure   Annual Monitoring of Persistent      Medications (ACE/ARB, digoxin, diuretics)    Potassium  Annually Potassium (mmol/L)   Date Value   04/12/2024 4.5     POTASSIUM (no units)   Date Value   11/12/2022 4.7         No data to display                Creatinine  Annually CREATININE (mg/dL)   Date Value   03/27/2014 0.79   10/06/2011 1.05     Creatinine (mg/dL)   Date Value   04/12/2024 1.17         No data to display                Digoxin Serum Conc  Annually No results found for: \"DIGOXIN\"      No data to display                Diabetes      HgbA1C  Annually No results found for: \"A1C\"      No data to display                Creat/alb ratio  Annually CREATININE (mg/dL)   Date Value   03/27/2014 0.79   10/06/2011 1.05     Creatinine (mg/dL)   Date Value   04/12/2024 1.17        LDL  Annually LDL Cholesterol (mg/dL)   Date Value   04/12/2024 94   11/12/2022 80         No data to display                 Dilated Eye exam  Annually      No data to display                   No data to display                Asthma  (Annually between Nov. 1 & Dec. 31)    Date of last AAP/ACT and counseling given on importance of controller meds.                 ALLERGIES:   No Known Allergies    CURRENT MEDICATIONS:   Current Outpatient Medications   Medication Sig Dispense Refill    Turmeric Curcumin 500 MG Oral Cap Take 1 capsule by mouth daily.      simvastatin 40 MG Oral Tab Take 1 tablet (40 mg total) by mouth nightly. 90 tablet 1    losartan 50 MG Oral Tab Take 1 tablet (50 mg total) by mouth daily. 90 tablet 1    OMEPRAZOLE 20 MG Oral Capsule Delayed Release TAKE ONE CAPSULE BY MOUTH EVERY MORNING (Patient taking differently: Take 1 capsule (20 mg total) by mouth every morning.) 90 capsule 2    Bettina 500 MG Oral Cap Take 1 capsule by mouth daily.      Cyanocobalamin (B-12) 1000 MCG Oral Cap Take 1 capsule by mouth daily.      Calcium Glycerophosphate (PRELIEF) 340 (65-50) MG (CA-P) Oral Tab Take 1 tablet by mouth daily.      fluorometholone 0.1 % Ophthalmic Suspension  Place 1 drop into both eyes daily.      Lutein 40 MG Oral Cap Take 1 capsule by mouth daily.      MAGNESIUM MALATE OR Take 2 g by mouth daily.      Cholecalciferol (VITAMIN D-3) 1000 units Oral Cap Take 1 capsule by mouth daily.        MEDICAL INFORMATION:   Past Medical History:    Abdominal hernia    Hiatal    Allergic rhinitis    Minimal    Alopecia, unspecified    Arthritis    Consulted podiatrist 2017; Also bicep tendonitis    Atherosclerosis of coronary artery    Aortic aneurism; calcium score 90    Hardy's esophagus    x2    Change in hair    Anemia causing flaking nails    Decorative tattoo    Disorder of prostate    BPH    Esophageal reflux    Eye disease    Vitrectomies    Frequent urination    Urologist next week    Heartburn    3 year upper GI recalls at .    Hernia of unspecified site of abdominal cavity without mention of obstruction or gangrene    High blood pressure    High cholesterol    Indigestion    Resolved with Omeprazole    Lipid screening    Normocytic anemia due to blood loss    Other and unspecified hyperlipidemia    Unspecified essential hypertension    Visual impairment    reading glasses    Wears glasses      Past Surgical History:   Procedure Laterality Date    Cataract      Cholecystectomy      Colonoscopy  2008    Colonoscopy  2019    10-year recall    Egd  2023    Other surgical history      Upper Gastrointestinal Endoscopy    Other surgical history      gallbladder surgery    Other surgical history      Uvulectomy    Tonsillectomy  1960    Vasectomy        Family History   Problem Relation Age of Onset    Cancer Father         Stomach    Hypertension Father     Other (gout) Father     Lipids Father         Hyperlipidemia    Obesity Father     Other (hepatitis) Mother         blood transfusion    No Known Problems Daughter     No Known Problems Maternal Grandmother     No Known Problems Maternal Grandfather     No Known Problems Paternal Grandmother     Other (athersclerosis)  Paternal Grandfather     Cancer Brother     No Known Problems Daughter       SOCIAL HISTORY:   Social History     Socioeconomic History    Marital status:    Tobacco Use    Smoking status: Never     Passive exposure: Never    Smokeless tobacco: Never   Vaping Use    Vaping status: Never Used   Substance and Sexual Activity    Alcohol use: Yes     Comment: 1-2 drinks daily    Drug use: Yes     Types: Cannabis     Comment: occasional    Sexual activity: Yes     Partners: Female   Other Topics Concern    Caffeine Concern Yes    Stress Concern No    Weight Concern No    Special Diet No    Exercise Yes    Seat Belt Yes     Occ: consultant executive  : yes       REVIEW OF SYSTEMS:   GENERAL: feels well otherwise  SKIN: denies any unusual skin lesions  EYES: denies blurred vision or double vision  HEENT: denies nasal congestion, sinus pain or ST  LUNGS: denies shortness of breath with exertion  CARDIOVASCULAR: denies chest pain on exertion  GI: denies abdominal pain, denies heartburn  : denies nocturia or changes in stream  MUSCULOSKELETAL: denies back pain  NEURO: denies headaches  PSYCHE: denies depression or anxiety  HEMATOLOGIC: denies hx of anemia  ENDOCRINE: denies thyroid history  ALL/ASTHMA: denies hx of allergy or asthma    EXAM:   /78 (BP Location: Right arm, Patient Position: Sitting, Cuff Size: adult)   Pulse 73   Temp 97.6 °F (36.4 °C) (Temporal)   Resp 22   Ht 5' 11.65\" (1.82 m)   Wt 204 lb 12.8 oz (92.9 kg)   SpO2 96%   BMI 28.04 kg/m²   GENERAL: well developed, well nourished, in no apparent distress  SKIN: no rashes, no suspicious lesions  HEENT: atraumatic, normocephalic, ears and throat are clear  EYES:PERRLA, EOMI, normal optic disk, conjunctiva are clear  NECK: supple, no adenopathy, no bruits  CHEST: no chest tenderness  BREAST: no dominant or suspicious mass  LUNGS: clear to auscultation  CARDIO: RRR without murmur  GI: good BS's, no masses, HSM or tenderness  : two  descended testes, no masses, no hernia, no penile lesions  RECTAL: good rectal tone, prostate shows no masses  MUSCULOSKELETAL: back is not tender, FROM of the back  EXTREMITIES: no cyanosis, clubbing or edema  NEURO: Oriented times three, cranial nerves are intact, motor and sensory are grossly intact    ASSESSMENT AND OTHER RELEVANT CHRONIC CONDITIONS:   Vaughn Kenny is a 69 year old male who presents for an Annual Health Assessment.     PLAN SUMMARY:   1.Annual physical exam  -Encourage Mediterranean diet  -Encouraged exercise 30 minutes to 60 minutes 3-5 times weekly for 150-300 mins  to prevent obesity and chronic disease and eliminate stress and its effect on the body.  -encouraged to continue not smoking  - recommend condom use--safe sex practices discussed- CDC recommendation for all  or unmarried couples  -immunizations UTD-completed COVID-19 booster, completed influenza annually  -Vitamin D3 8548-4073 units daily recommended  -Needs 1000 mg of calcium daily for osteoporosis prevention discussed  Completed work annual health labs 10/2023  - Comp Metabolic Panel; Future  - Lipid Panel; Future    2. Essential hypertension  - Comp Metabolic Panel; Future in 6months  Controlled  Encouraged 150-130mins aerobic exercise weekly  <2g Na diet daily and follow Mediterranean diet  Weight loss if overweight  Home b/p monitoring  goal <140/90- 85% of time, optimal 110-120/70-80 bring readings and cuff to each visit.  Continue losartan  Labs and OV q 6 months    3. Mixed hyperlipidemia  - Comp Metabolic Panel; Future  - Lipid Panel; Future  Controlled  Will upload labs    4. BMI 28.0-28.9,adult  Physically fit.  May benefit from 10 pound weight loss but is more muscle than adipose based on BMI.    5. Aneurysm of ascending aorta without rupture (HCC)  Continue follow up with cardiology, .    6. Gastroesophageal reflux disease without esophagitis  7. Hardy's esophagus without dysplasia  Continue  omeprazole  Repeat EGD q3 years- due 2026  Hx of stomach polyps removed by dr. Jimenez    8. Normocytic anemia due to blood loss  11. Iron deficiency anemia due to chronic blood loss  Cbc normal  Will upload health screen  Extensive GI work up    9. B12 deficiency  Cont B12 supplement    10. Retinal tear of left eye  S/p laser treatment    12. Screening for prostate cancer  Normal PSA 10/2024 on health  No symptoms    13. Screening for endocrine, nutritional, metabolic and immunity disorder  - Comp Metabolic Panel; Future        The patient indicates understanding of these issues and agrees to the plan.  Return in about 6 months (around 4/21/2025) for HTN, HL, discuss labs.    Cut alcohol to one glass of wine or 1oz of bourbon  in 24 hours  Diet counseling perfomed  Exercise counseling perfomed    SUGGESTED VACCINATIONS - Influenza, Pneumococcal, Zoster, Tetanus     Immunization History   Administered Date(s) Administered    Covid-19 Vaccine Moderna 50 Mcg/0.25 Ml 04/01/2022    Covid-19 Vaccine Moderna Bivalent 50mcg/0.5mL 12+ years 09/06/2022, 04/29/2023    Covid-19 Vaccine Pfizer 30 mcg/0.3 ml 03/05/2021, 03/26/2021, 09/26/2021    FLU VAC High Dose 65 YRS & Older PRSV Free (23352) 09/29/2020, 10/07/2023, 10/14/2024    FLUZONE 6 months and older PFS 0.5 ml (58272) 11/10/2019    Fluzone Vaccine Medicare () 09/29/2020    High Dose Fluzone Influenza Vaccine, 65yr+ PF 0.5mL (00368) 10/12/2021    Influenza 11/07/2012, 11/06/2013, 11/19/2014, 09/30/2016, 09/30/2017, 09/01/2018, 11/09/2019, 10/18/2022    Moderna Covid-19 Vaccine 50mcg/0.5ml 12yrs+ 12/12/2023, 10/07/2024    Pneumococcal (Prevnar 13) 06/23/2020    Pneumovax 23 06/24/2019    RSV, recombinant, RSVpreF, adjuvanted (Arexvy) 10/07/2023    TDAP 01/07/2013, 11/30/2021    Varicella Deferred (Had Chicken Pox) 04/13/1959    Zoster Vaccine Live (Zostavax) 10/26/2015    Zoster Vaccine Recombinant Adjuvanted (Shingrix) 12/18/2020, 05/08/2021       Influenza  Annually   Pneumococcal if high risk   Td/Tdap once then every 10 years   HPV Males 11-21   Zoster (Shingles) 60 and older: one dose   Varicella 2 doses if not immune   MMR 1-2 doses if born after 1957 and not immune

## 2024-10-23 ENCOUNTER — LAB ENCOUNTER (OUTPATIENT)
Dept: LAB | Age: 69
End: 2024-10-23
Attending: FAMILY MEDICINE
Payer: COMMERCIAL

## 2024-10-23 DIAGNOSIS — R71.8 ELEVATED MCV: ICD-10-CM

## 2024-10-23 DIAGNOSIS — E53.8 B12 DEFICIENCY: ICD-10-CM

## 2024-10-23 LAB
BASOPHILS # BLD AUTO: 0.04 X10(3) UL (ref 0–0.2)
BASOPHILS NFR BLD AUTO: 0.8 %
EOSINOPHIL # BLD AUTO: 0.14 X10(3) UL (ref 0–0.7)
EOSINOPHIL NFR BLD AUTO: 2.7 %
ERYTHROCYTE [DISTWIDTH] IN BLOOD BY AUTOMATED COUNT: 11.9 %
FOLATE SERPL-MCNC: 11.6 NG/ML (ref 5.4–?)
HCT VFR BLD AUTO: 43 %
HGB BLD-MCNC: 14.8 G/DL
IMM GRANULOCYTES # BLD AUTO: 0.02 X10(3) UL (ref 0–1)
IMM GRANULOCYTES NFR BLD: 0.4 %
LYMPHOCYTES # BLD AUTO: 2.1 X10(3) UL (ref 1–4)
LYMPHOCYTES NFR BLD AUTO: 40.7 %
MCH RBC QN AUTO: 33.6 PG (ref 26–34)
MCHC RBC AUTO-ENTMCNC: 34.4 G/DL (ref 31–37)
MCV RBC AUTO: 97.5 FL
MONOCYTES # BLD AUTO: 0.44 X10(3) UL (ref 0.1–1)
MONOCYTES NFR BLD AUTO: 8.5 %
NEUTROPHILS # BLD AUTO: 2.42 X10 (3) UL (ref 1.5–7.7)
NEUTROPHILS # BLD AUTO: 2.42 X10(3) UL (ref 1.5–7.7)
NEUTROPHILS NFR BLD AUTO: 46.9 %
PLATELET # BLD AUTO: 198 10(3)UL (ref 150–450)
RBC # BLD AUTO: 4.41 X10(6)UL
VIT B12 SERPL-MCNC: 521 PG/ML (ref 211–911)
WBC # BLD AUTO: 5.2 X10(3) UL (ref 4–11)

## 2024-10-23 PROCEDURE — 82746 ASSAY OF FOLIC ACID SERUM: CPT | Performed by: FAMILY MEDICINE

## 2024-10-23 PROCEDURE — 82607 VITAMIN B-12: CPT | Performed by: FAMILY MEDICINE

## 2024-10-23 PROCEDURE — 85025 COMPLETE CBC W/AUTO DIFF WBC: CPT | Performed by: FAMILY MEDICINE

## 2024-11-21 NOTE — IMAGING NOTE
Pt scheduled for CTA gated coronary study.    Discussed location, arrival date and time Monday 11/25/24 @ 09:15    Instructed to hold all caffeine for at least 12 hours prior to scan, ok to eat light meals, hydrate well with water, and take all prescribed medications as ordered.     All questions answered and Vaughn verbalized understanding.

## 2024-11-25 ENCOUNTER — HOSPITAL ENCOUNTER (OUTPATIENT)
Dept: CT IMAGING | Facility: HOSPITAL | Age: 69
Discharge: HOME | End: 2024-11-25
Attending: INTERNAL MEDICINE
Payer: COMMERCIAL

## 2024-11-25 VITALS — SYSTOLIC BLOOD PRESSURE: 95 MMHG | DIASTOLIC BLOOD PRESSURE: 66 MMHG | HEART RATE: 64 BPM

## 2024-11-25 DIAGNOSIS — I72.3 ANEURYSM ARTERY, ILIAC (HCC): ICD-10-CM

## 2024-11-25 DIAGNOSIS — I71.21 ASCENDING AORTIC ANEURYSM (HCC): ICD-10-CM

## 2024-11-25 LAB
CREAT BLD-MCNC: 1.1 MG/DL
EGFRCR SERPLBLD CKD-EPI 2021: 72 ML/MIN/1.73M2 (ref 60–?)

## 2024-11-25 PROCEDURE — 71275 CT ANGIOGRAPHY CHEST: CPT | Performed by: INTERNAL MEDICINE

## 2024-11-25 PROCEDURE — 82565 ASSAY OF CREATININE: CPT

## 2024-11-25 RX ORDER — DILTIAZEM HYDROCHLORIDE 5 MG/ML
5 INJECTION INTRAVENOUS SEE ADMIN INSTRUCTIONS
Status: DISCONTINUED | OUTPATIENT
Start: 2024-11-25 | End: 2024-11-27

## 2024-11-25 RX ORDER — METOPROLOL TARTRATE 1 MG/ML
5 INJECTION, SOLUTION INTRAVENOUS SEE ADMIN INSTRUCTIONS
Status: DISCONTINUED | OUTPATIENT
Start: 2024-11-25 | End: 2024-11-27

## 2024-11-25 NOTE — IMAGING NOTE
1010- Pt A+O x4. Procedure explained and questions answered.   Pt ambulatory and assisted to CT table. GFR 72.  See flowsheet for VS.    CT tech Silvia     Contrast= 70  Saline= 100  Average HR= 53    1017- Pt tolerated scan well.  Denies S/S of contrast reaction.  IV d/c'd at 1017.  Cath intact, bleeding controlled.  Pt ambulatory and escorted to changing room for discharge home.

## 2024-12-26 ENCOUNTER — MED REC SCAN ONLY (OUTPATIENT)
Dept: FAMILY MEDICINE CLINIC | Facility: CLINIC | Age: 69
End: 2024-12-26

## 2025-01-16 DIAGNOSIS — K21.9 GASTROESOPHAGEAL REFLUX DISEASE: ICD-10-CM

## 2025-01-16 NOTE — TELEPHONE ENCOUNTER
Refill(s) Requested:   Requested Prescriptions     Pending Prescriptions Disp Refills    omeprazole 20 MG Oral Capsule Delayed Release [Pharmacy Med Name: omeprazole 20 mg capsule,delayed release] 90 capsule 0     Sig: TAKE ONE CAPSULE BY MOUTH EVERY MORNING     Medication Last Prescribed:  Omeprazole 20 mg 4/18/2024 90 hui 2 refills     Has dose or medication been changed    since last prescription? *Review notes*    []  Yes       [x]  No     Last office visit: Visit date not found (in-office)  Visit date not found (virtual visit)     Relevant details from LOV note: Gastroesophageal reflux disease without esophagitis  7. Hardy's esophagus without dysplasia  Continue omeprazole  Repeat EGD q3 years- due 2026  Hx of stomach polyps removed by dr. Jimenez     Relevant lab results: N/A    Patient was asked to follow-up in: 6 months    Appointment due: April 2025    Future Appointments: Visit date not found     Action taken:  [x] Medication refilled per protocol

## 2025-04-03 ENCOUNTER — MED REC SCAN ONLY (OUTPATIENT)
Dept: FAMILY MEDICINE CLINIC | Facility: CLINIC | Age: 70
End: 2025-04-03

## 2025-04-10 DIAGNOSIS — K21.9 GASTROESOPHAGEAL REFLUX DISEASE: ICD-10-CM

## 2025-04-10 RX ORDER — OMEPRAZOLE 20 MG/1
20 CAPSULE, DELAYED RELEASE ORAL EVERY MORNING
Qty: 90 CAPSULE | Refills: 0 | Status: SHIPPED | OUTPATIENT
Start: 2025-04-10

## 2025-04-10 NOTE — TELEPHONE ENCOUNTER
Refill(s) Requested:   Requested Prescriptions     Pending Prescriptions Disp Refills    OMEPRAZOLE 20 MG Oral Capsule Delayed Release [Pharmacy Med Name: omeprazole 20 mg capsule,delayed release] 90 capsule 0     Sig: TAKE ONE CAPSULE BY MOUTH EVERY MORNING     Medication Last Prescribed:  1/16/2025 90 days 0 refills     Has dose or medication been changed    since last prescription? *Review notes*    []  Yes       [x]  No     Last office visit: Visit date not found (in-office)  Visit date not found (virtual visit)     Relevant details from LOV note: 6. Gastroesophageal reflux disease without esophagitis  7. Hardy's esophagus without dysplasia  Continue omeprazole  Repeat EGD q3 years- due 2026  Hx of stomach polyps removed by dr. Jimenez     Relevant lab results: N/A    Patient was asked to follow-up in: The patient indicates understanding of these issues and agrees to the plan.  Return in about 6 months (around 4/21/2025) for HTN, HL, discuss labs.    Appointment due: April 2025    Future Appointments: Visit date not found     Action taken:  [x] Medication refilled per protocol

## 2025-04-14 ENCOUNTER — LAB ENCOUNTER (OUTPATIENT)
Dept: LAB | Age: 70
End: 2025-04-14
Attending: FAMILY MEDICINE
Payer: COMMERCIAL

## 2025-04-14 DIAGNOSIS — Z13.0 SCREENING FOR ENDOCRINE, NUTRITIONAL, METABOLIC AND IMMUNITY DISORDER: ICD-10-CM

## 2025-04-14 DIAGNOSIS — Z13.228 SCREENING FOR ENDOCRINE, NUTRITIONAL, METABOLIC AND IMMUNITY DISORDER: ICD-10-CM

## 2025-04-14 DIAGNOSIS — Z13.29 SCREENING FOR ENDOCRINE, NUTRITIONAL, METABOLIC AND IMMUNITY DISORDER: ICD-10-CM

## 2025-04-14 DIAGNOSIS — Z00.00 ANNUAL PHYSICAL EXAM: ICD-10-CM

## 2025-04-14 DIAGNOSIS — E78.2 MIXED HYPERLIPIDEMIA: ICD-10-CM

## 2025-04-14 DIAGNOSIS — I10 ESSENTIAL HYPERTENSION: ICD-10-CM

## 2025-04-14 DIAGNOSIS — Z13.21 SCREENING FOR ENDOCRINE, NUTRITIONAL, METABOLIC AND IMMUNITY DISORDER: ICD-10-CM

## 2025-04-14 LAB
ALBUMIN SERPL-MCNC: 4.9 G/DL (ref 3.2–4.8)
ALBUMIN/GLOB SERPL: 2 {RATIO} (ref 1–2)
ALP LIVER SERPL-CCNC: 38 U/L (ref 45–117)
ALT SERPL-CCNC: 16 U/L (ref 10–49)
ANION GAP SERPL CALC-SCNC: 10 MMOL/L (ref 0–18)
AST SERPL-CCNC: 16 U/L (ref ?–34)
BILIRUB SERPL-MCNC: 0.8 MG/DL (ref 0.2–1.1)
BUN BLD-MCNC: 14 MG/DL (ref 9–23)
CALCIUM BLD-MCNC: 10 MG/DL (ref 8.7–10.6)
CHLORIDE SERPL-SCNC: 106 MMOL/L (ref 98–112)
CHOLEST SERPL-MCNC: 127 MG/DL (ref ?–200)
CO2 SERPL-SCNC: 26 MMOL/L (ref 21–32)
CREAT BLD-MCNC: 1.12 MG/DL (ref 0.7–1.3)
EGFRCR SERPLBLD CKD-EPI 2021: 70 ML/MIN/1.73M2 (ref 60–?)
FASTING PATIENT LIPID ANSWER: YES
FASTING STATUS PATIENT QL REPORTED: YES
GLOBULIN PLAS-MCNC: 2.4 G/DL (ref 2–3.5)
GLUCOSE BLD-MCNC: 92 MG/DL (ref 70–99)
HDLC SERPL-MCNC: 45 MG/DL (ref 40–59)
LDLC SERPL CALC-MCNC: 65 MG/DL (ref ?–100)
NONHDLC SERPL-MCNC: 82 MG/DL (ref ?–130)
OSMOLALITY SERPL CALC.SUM OF ELEC: 294 MOSM/KG (ref 275–295)
POTASSIUM SERPL-SCNC: 4.2 MMOL/L (ref 3.5–5.1)
PROT SERPL-MCNC: 7.3 G/DL (ref 5.7–8.2)
SODIUM SERPL-SCNC: 142 MMOL/L (ref 136–145)
TRIGL SERPL-MCNC: 86 MG/DL (ref 30–149)
VLDLC SERPL CALC-MCNC: 13 MG/DL (ref 0–30)

## 2025-04-14 PROCEDURE — 80053 COMPREHEN METABOLIC PANEL: CPT | Performed by: FAMILY MEDICINE

## 2025-04-14 PROCEDURE — 80061 LIPID PANEL: CPT | Performed by: FAMILY MEDICINE

## 2025-04-21 ENCOUNTER — OFFICE VISIT (OUTPATIENT)
Dept: FAMILY MEDICINE CLINIC | Facility: CLINIC | Age: 70
End: 2025-04-21
Payer: COMMERCIAL

## 2025-04-21 VITALS
TEMPERATURE: 97 F | WEIGHT: 206.38 LBS | OXYGEN SATURATION: 96 % | SYSTOLIC BLOOD PRESSURE: 124 MMHG | HEART RATE: 75 BPM | BODY MASS INDEX: 28.26 KG/M2 | RESPIRATION RATE: 18 BRPM | DIASTOLIC BLOOD PRESSURE: 72 MMHG | HEIGHT: 71.65 IN

## 2025-04-21 DIAGNOSIS — R63.4 WEIGHT LOSS: ICD-10-CM

## 2025-04-21 DIAGNOSIS — K21.9 GASTROESOPHAGEAL REFLUX DISEASE: ICD-10-CM

## 2025-04-21 DIAGNOSIS — K21.9 GASTROESOPHAGEAL REFLUX DISEASE WITHOUT ESOPHAGITIS: ICD-10-CM

## 2025-04-21 DIAGNOSIS — I10 ESSENTIAL HYPERTENSION: ICD-10-CM

## 2025-04-21 DIAGNOSIS — I71.21 ANEURYSM OF ASCENDING AORTA WITHOUT RUPTURE: ICD-10-CM

## 2025-04-21 DIAGNOSIS — E78.2 MIXED HYPERLIPIDEMIA: ICD-10-CM

## 2025-04-21 DIAGNOSIS — Z12.5 SCREENING FOR MALIGNANT NEOPLASM OF PROSTATE: Primary | ICD-10-CM

## 2025-04-21 DIAGNOSIS — Z86.2 HISTORY OF IRON DEFICIENCY ANEMIA: ICD-10-CM

## 2025-04-21 DIAGNOSIS — K22.70 BARRETT'S ESOPHAGUS DETERMINED BY ENDOSCOPY: ICD-10-CM

## 2025-04-21 PROBLEM — H16.103 SUPERFICIAL KERATITIS OF BOTH EYES: Status: ACTIVE | Noted: 2025-04-21

## 2025-04-21 PROCEDURE — 3078F DIAST BP <80 MM HG: CPT | Performed by: FAMILY MEDICINE

## 2025-04-21 PROCEDURE — 3074F SYST BP LT 130 MM HG: CPT | Performed by: FAMILY MEDICINE

## 2025-04-21 PROCEDURE — 3008F BODY MASS INDEX DOCD: CPT | Performed by: FAMILY MEDICINE

## 2025-04-21 PROCEDURE — G2211 COMPLEX E/M VISIT ADD ON: HCPCS | Performed by: FAMILY MEDICINE

## 2025-04-21 PROCEDURE — 99214 OFFICE O/P EST MOD 30 MIN: CPT | Performed by: FAMILY MEDICINE

## 2025-04-21 RX ORDER — OMEPRAZOLE 20 MG/1
20 CAPSULE, DELAYED RELEASE ORAL EVERY MORNING
Qty: 90 CAPSULE | Refills: 3 | Status: SHIPPED | OUTPATIENT
Start: 2025-04-21

## 2025-04-21 RX ORDER — SIMVASTATIN 40 MG
40 TABLET ORAL NIGHTLY
Qty: 90 TABLET | Refills: 1 | Status: SHIPPED | OUTPATIENT
Start: 2025-04-21

## 2025-04-21 RX ORDER — LOSARTAN POTASSIUM 50 MG/1
50 TABLET ORAL DAILY
Qty: 90 TABLET | Refills: 1 | Status: SHIPPED | OUTPATIENT
Start: 2025-04-21

## 2025-04-21 NOTE — PROGRESS NOTES
The following individual(s) verbally consented to be recorded using ambient AI listening technology and understand that they can each withdraw their consent to this listening technology at any point by asking the clinician to turn off or pause the recording:    Patient name: Vaughn Satnam Kenny  Additional names:  N/a

## 2025-04-21 NOTE — PROGRESS NOTES
CHIEF COMPLAINT:   Chief Complaint   Patient presents with    Medication Follow-Up         HPI:     Vaughn Kenny is a 70 year old male presents for medication monitoring and discuss labs.    Patient presents for recheck of hypertension. Pt has been taking medications as instructed, no medication side effects, no home BP monitoring.  Denies headache, dizziness, chest pain, shortness of breath, leg swelling, exercise intolerance    Pt presents for recheck of hyperlipidemia. Patient reports taking medications as instructed, no medication side effects noted. Denies any generalized muscle aches.  Admits he had had some muscle aches that he has associated with biking but wanted to be sure that was holding simvastatin then took a lower dose for a while that was left over.  Leg pain resolved.  Patient had leg pain even when off for a few days of simvastatin.  States not attributed to medication.  Denies any headache, dizziness, chest pain, shortness of breath, bleeding issues, leg swelling.  Bikes 100 miles a week.  Very active    Aortic aneurysm- widened root - followed by Dr.Ann Cruz. denies chest pain or sob- feels well.  States had several imaging and monitoring with no change and cardiologist suspects widened root is genetic.  Patient will have annual echocardiogram of the aortic aneurysm.  Stable x 15 years may be congenital.  Denies any chest pain or shortness of breath    WALTER due to blood donation.  Completed consult with Dr. Loredo-hematologist 8/2023-recommended starting iron patient has been taking and had repeat CBC only on 10/4/2023 at health screening work labs.  Stopped iron 10/2023 under recommendation of hematologist.  Not donating blood.  Had extensive GI work-up upper or lower endoscopy and capsule study in 2023.  Has history of Hardy's esophagus.  Hematologist in consult attributes WALTER to blood donation.  Recommends if patient would like to donate blood needs to be on appropriate iron  replacement.  For now patient is declining blood donation.  Off iron.  CBC and iron studies q. 6 months.    H/0 Rectus diastasis- x years.  No abdominal pain. ADL and bike riding not affected.     History of Present Illness  Vaughn Kenny is a 70-year-old male who presents for a routine follow-up visit.    He has been monitoring his alkaline phosphatase levels, which have been chronically borderline low for years. His triglycerides and LDL levels remain low despite recent dietary indulgences.    He manages his blood pressure with losartan and reports generally stable readings, with occasional increases during stressful situations. He does not regularly check his blood pressure at home but notes it is usually around 130/85 mmHg when he does.    He is physically active, biking approximately 100 miles a week, though this decreases to 20-30 miles in colder months. He recently biked 340 miles in [place] and has acquired a new trike for his activities.    He has a history of an aortic aneurysm, which has been stable for 20 years. A recent heart CT was uneventful, and he is awaiting further discussion with his cardiologist.    He is on simvastatin 40 mg for cholesterol management and omeprazole for gastrointestinal issues. He has a history of Hardy's esophagus, though the diagnosis has been inconsistent over the years.    He has a family history of hepatitis, as his mother  from non-A, non-B hepatitis following a blood transfusion. He is considering updating his hepatitis vaccinations, especially in light of potential international travel.    He experienced a knee tendon injury from yoga about four to five months ago, which caused limping for a couple of months but is now improving.    He underwent retinal surgery six months ago and reports good recovery, with his retina specialist noting positive outcomes. He has monovision and manages well with his current vision status.    No headache, dizziness, chest pain,  shortness of breath, or exercise intolerance. No difficulty swallowing or changes in gastrointestinal symptoms. He denies any medication side effects.        Wt Readings from Last 6 Encounters:   04/21/25 206 lb 6.4 oz (93.6 kg)   10/21/24 204 lb 12.8 oz (92.9 kg)   07/09/24 204 lb (92.5 kg)   07/01/24 210 lb (95.3 kg)   04/23/24 209 lb (94.8 kg)   10/16/23 208 lb 6.4 oz (94.5 kg)         HISTORY:  Past Medical History:    Abdominal hernia    Hiatal    Allergic rhinitis    Minimal    Alopecia, unspecified    Arthritis    Consulted podiatrist 2017; Also bicep tendonitis    Atherosclerosis of coronary artery    Aortic aneurism; calcium score 90    Hardy's esophagus    x2    Change in hair    Anemia causing flaking nails    Decorative tattoo    Disorder of prostate    BPH    Esophageal reflux    Eye disease    Vitrectomies    Frequent urination    Urologist next week    Heartburn    3 year upper GI recalls at .    Hernia of unspecified site of abdominal cavity without mention of obstruction or gangrene    High blood pressure    High cholesterol    Indigestion    Resolved with Omeprazole    Lipid screening    Normocytic anemia due to blood loss    Other and unspecified hyperlipidemia    Unspecified essential hypertension    Visual impairment    reading glasses    Wears glasses      Past Surgical History:   Procedure Laterality Date    Cataract      Cholecystectomy      Colonoscopy  2008    Colonoscopy  2019    10-year recall    Egd  2023    Other surgical history      Upper Gastrointestinal Endoscopy    Other surgical history      gallbladder surgery    Other surgical history      Uvulectomy    Tonsillectomy  1960    Vasectomy        Family History   Problem Relation Age of Onset    Cancer Father         Stomach    Hypertension Father     Other (gout) Father     Lipids Father         Hyperlipidemia    Obesity Father     Other (hepatitis) Mother         blood transfusion    No Known Problems Daughter     No Known  Problems Maternal Grandmother     No Known Problems Maternal Grandfather     No Known Problems Paternal Grandmother     Other (athersclerosis) Paternal Grandfather     Cancer Brother     No Known Problems Daughter       Social History:   Social History     Socioeconomic History    Marital status:    Tobacco Use    Smoking status: Never     Passive exposure: Never    Smokeless tobacco: Never   Vaping Use    Vaping status: Never Used   Substance and Sexual Activity    Alcohol use: Yes     Comment: 1-2 drinks daily    Drug use: Yes     Types: Cannabis     Comment: occasional    Sexual activity: Yes     Partners: Female   Other Topics Concern    Caffeine Concern Yes    Stress Concern No    Weight Concern No    Special Diet No    Exercise Yes    Seat Belt Yes        Medications (Active prior to today's visit):  Current Outpatient Medications   Medication Sig Dispense Refill    OMEPRAZOLE 20 MG Oral Capsule Delayed Release TAKE ONE CAPSULE BY MOUTH EVERY MORNING 90 capsule 0    losartan 50 MG Oral Tab Take 1 tablet (50 mg total) by mouth daily. 90 tablet 1    simvastatin 40 MG Oral Tab Take 1 tablet (40 mg total) by mouth nightly. 90 tablet 1    Turmeric Curcumin 500 MG Oral Cap Take 1 capsule by mouth daily.      Bettina 500 MG Oral Cap Take 1 capsule by mouth daily.      Cyanocobalamin (B-12) 1000 MCG Oral Cap Take 1 capsule by mouth daily.      Calcium Glycerophosphate (PRELIEF) 340 (65-50) MG (CA-P) Oral Tab Take 1 tablet by mouth daily.      fluorometholone 0.1 % Ophthalmic Suspension Place 1 drop into both eyes daily.      Lutein 40 MG Oral Cap Take 1 capsule by mouth daily.      Cholecalciferol (VITAMIN D-3) 1000 units Oral Cap Take 1 capsule by mouth daily.         Allergies:  No Known Allergies    PSFH elements reviewed from today and agreed except as otherwise stated in HPI.  PHYSICAL EXAM:   /72 (BP Location: Left arm, Patient Position: Sitting, Cuff Size: adult)   Pulse 75   Temp 97.4 °F (36.3  °C) (Temporal)   Resp 18   Ht 5' 11.65\" (1.82 m)   Wt 206 lb 6.4 oz (93.6 kg)   SpO2 96%   BMI 28.27 kg/m²   Vital signs reviewed.Appears stated age, well groomed.  Physical Exam   GENERAL: well developed, well nourished,in no apparent distress  EYES; PERRLA, EOM-i B/L. Sclera clear and non icteric bilateral  SKIN: no rashes,no suspicious lesions  HEENT: atraumatic, normocephalic  NECK: supple,no adenopathy,no bruits, no JVD  LUNGS: clear to auscultation bilateral. No RRW. Good inspiratory and expiratory effort  CARDIO: RRR without murmur, clear S1, S2  VS: no carotid artery bruit bilateral.    GI: good BS's,no masses,No HSM or tenderness.   EXTREMITIES: no cyanosis, clubbing or edema, bilateral. No calf tenderness    LABS     [facility] Lab Encounter on 04/14/2025   Component Date Value    Glucose 04/14/2025 92     Sodium 04/14/2025 142     Potassium 04/14/2025 4.2     Chloride 04/14/2025 106     CO2 04/14/2025 26.0     Anion Gap 04/14/2025 10     BUN 04/14/2025 14     Creatinine 04/14/2025 1.12     Calcium, Total 04/14/2025 10.0     Calculated Osmolality 04/14/2025 294     eGFR-Cr 04/14/2025 70     AST 04/14/2025 16     ALT 04/14/2025 16     Alkaline Phosphatase 04/14/2025 38 (L)     Bilirubin, Total 04/14/2025 0.8     Total Protein 04/14/2025 7.3     Albumin 04/14/2025 4.9 (H)     Globulin  04/14/2025 2.4     A/G Ratio 04/14/2025 2.0     Patient Fasting for CMP? 04/14/2025 Yes     Cholesterol, Total 04/14/2025 127     HDL Cholesterol 04/14/2025 45     Triglycerides 04/14/2025 86     LDL Cholesterol 04/14/2025 65     VLDL 04/14/2025 13     Non HDL Chol 04/14/2025 82     Patient Fasting for Lipi* 04/14/2025 Yes          [facility] Lab Encounter on 04/12/2024   Component Date Value    Glucose 04/12/2024 99     Sodium 04/12/2024 141     Potassium 04/12/2024 4.5     Chloride 04/12/2024 110     CO2 04/12/2024 26.0     Anion Gap 04/12/2024 5     BUN 04/12/2024 14     Creatinine 04/12/2024 1.17     Calcium, Total 04/12/2024 9.4      Calculated Osmolality 04/12/2024 293     eGFR-Cr 04/12/2024 67     AST 04/12/2024 7 (L)     ALT 04/12/2024 19     Alkaline Phosphatase 04/12/2024 40 (L)     Bilirubin, Total 04/12/2024 0.8     Total Protein 04/12/2024 7.0     Albumin 04/12/2024 3.8     Globulin  04/12/2024 3.2     A/G Ratio 04/12/2024 1.2     Patient Fasting for CMP? 04/12/2024 Yes     Cholesterol, Total 04/12/2024 156     HDL Cholesterol 04/12/2024 44     Triglycerides 04/12/2024 99     LDL Cholesterol 04/12/2024 94     VLDL 04/12/2024 16     Non HDL Chol 04/12/2024 112     Patient Fasting for Lipi* 04/12/2024 Yes     Ferritin 04/12/2024 42.4     Iron 04/12/2024 87     Transferrin 04/12/2024 240     Total Iron Binding [place]* 04/12/2024 358     % Saturation 04/12/2024 24     WBC 04/12/2024 5.3     RBC 04/12/2024 4.54     HGB 04/12/2024 15.1     HCT 04/12/2024 45.1     PLT 04/12/2024 189.0     MCV 04/12/2024 99.3     MCH 04/12/2024 33.3     MCHC 04/12/2024 33.5     RDW 04/12/2024 12.2     Neutrophil Absolute Prel* 04/12/2024 2.57     Neutrophil Absolute 04/12/2024 2.57     Lymphocyte Absolute 04/12/2024 1.99     Monocyte Absolute 04/12/2024 0.51     Eosinophil Absolute 04/12/2024 0.13     Basophil Absolute 04/12/2024 0.05     Immature Granulocyte Abs* 04/12/2024 0.02     Neutrophil % 04/12/2024 48.7     Lymphocyte % 04/12/2024 37.8     Monocyte % 04/12/2024 9.7     Eosinophil % 04/12/2024 2.5     Basophil % 04/12/2024 0.9     Immature Granulocyte % 04/12/2024 0.4       Component      Latest Ref Rng 9/29/2020 11/12/2022 2/15/2023 3/15/2023   WBC      4.0 - 11.0 x10(3) uL 4.6  4.9 (E) 6.2  4.8    RBC      3.80 - 5.80 x10(6)uL 4.35  4.38 (E) 4.36  4.70    Hemoglobin      13.0 - 17.5 g/dL 14.5  12.6 (L) (E) 12.7 (L)  13.9    Hematocrit      39.0 - 53.0 % 43.2  39.3 (E) 39.6  43.1    Platelet Count      150.0 - 450.0 10(3)uL 179.0  210 (E) 215.0  211.0    MCV      80.0 - 100.0 fL 99.3  90 (E) 90.8  91.7    MCH      26.0 - 34.0 pg 33.3  28.8 (E) 29.1   29.6    MCHC      31.0 - 37.0 g/dL 33.6  32.1 (E) 32.1  32.3    RDW      % 12.0   14.1  14.3    RDW-SD      35.1 - 46.3 fL 44.0       Prelim Neutrophil Abs      1.50 - 7.70 x10 (3) uL 2.15   3.53  2.57    Neutrophils Absolute      1.50 - 7.70 x10(3) uL 2.15   3.53  2.57    Lymphocytes Absolute      1.00 - 4.00 x10(3) uL 1.77   1.89  1.63    Monocytes Absolute      0.10 - 1.00 x10(3) uL 0.48   0.60  0.44    Eosinophils Absolute      0.00 - 0.70 x10(3) uL 0.14   0.12  0.11    Basophils Absolute      0.00 - 0.20 x10(3) uL 0.04   0.04  0.03    Immature Granulocyte Absolute      0.00 - 1.00 x10(3) uL 0.03   0.01  0.01    Neutrophils %      % 46.6   57.1  53.7    Lymphocytes %      % 38.4   30.5  34.0    Monocytes %      % 10.4   9.7  9.2    Eosinophils %      % 3.0   1.9  2.3    Basophils %      % 0.9   0.6  0.6    Immature Granulocyte %      % 0.7   0.2  0.2      Component      Latest Ref Rn 5/23/2023 4/12/2024   WBC      4.0 - 11.0 x10(3) uL 5.0  5.3    RBC      3.80 - 5.80 x10(6)uL 4.47  4.54    Hemoglobin      13.0 - 17.5 g/dL 13.5  15.1    Hematocrit      39.0 - 53.0 % 41.3  45.1    Platelet Count      150.0 - 450.0 10(3)uL 174.0  189.0    MCV      80.0 - 100.0 fL 92.4  99.3    MCH      26.0 - 34.0 pg 30.2  33.3    MCHC      31.0 - 37.0 g/dL 32.7  33.5    RDW      % 13.7  12.2    RDW-SD      35.1 - 46.3 fL     Prelim Neutrophil Abs      1.50 - 7.70 x10 (3) uL 2.09  2.57    Neutrophils Absolute      1.50 - 7.70 x10(3) uL 2.09  2.57    Lymphocytes Absolute      1.00 - 4.00 x10(3) uL 2.08  1.99    Monocytes Absolute      0.10 - 1.00 x10(3) uL 0.59  0.51    Eosinophils Absolute      0.00 - 0.70 x10(3) uL 0.14  0.13    Basophils Absolute      0.00 - 0.20 x10(3) uL 0.03  0.05    Immature Granulocyte Absolute      0.00 - 1.00 x10(3) uL 0.02  0.02    Neutrophils %      % 42.3  48.7    Lymphocytes %      % 42.0  37.8    Monocytes %      % 11.9  9.7    Eosinophils %      % 2.8  2.5    Basophils %      % 0.6  0.9    Immature  Granulocyte %      % 0.4  0.4       Legend:  (L) Low  (E) External lab result  REVIEWED THIS VISIT  ASSESSMENT/PLAN:   70 year old male with    1. Essential hypertension  - Continue losartan 50 MG Oral Tab; Take 1 tablet (50 mg total) by mouth daily.  Dispense: 90 tablet; Refill: 1  - CBC With Differential With Platelet; Future  - Comp Metabolic Panel; Future  Controlled    2. Mixed hyperlipidemia  - Continue simvastatin 40 MG Oral Tab; Take 1 tablet (40 mg total) by mouth nightly.  Dispense: 90 tablet; Refill: 1  - Comp Metabolic Panel; Future  - Lipid Panel; Future  Controlled  Follow-up in office in 6 months and recheck labs    3. Gastroesophageal reflux disease  - omeprazole 20 MG Oral Capsule Delayed Release; Take 1 capsule (20 mg total) by mouth every morning.  Dispense: 90 capsule; Refill: 3  No dysphagia, epigastric pain, melena.  History    4. Screening for malignant neoplasm of prostate  - PSA, Total (Screening) [E]; Future    5. Weight loss- intentional  - TSH W Reflex To Free T4 [E]; Future    6. Hardy's esophagus determined by endoscopy  - CBC With Differential With Platelet; Future  Needs EGD every 3 years will be managed by [facility]-transferring care from [facility]  Continue omeprazole    7. Gastroesophageal reflux disease without esophagitis  - CBC With Differential With Platelet; Future  Continue omeprazole 20mg daily    8. History of iron deficiency anemia  - CBC With Differential With Platelet; Future  Resolved  Not on iron.  Not donating blood  Last CBC 4/12/2024 with hemoglobin of 15.1 hematocrit 45.1 and normal MCV platelets excetra  Will monitor every 6 months to q. Year  Negative EGD and colonoscopy and small bowel follow-through study.    9. Aneurysm of ascending aorta without rupture  Monitored by Dr. Cruz current cardiologist annually    Assessment & Plan  Aortic aneurysm  Atrial aneurysm has been well-managed for 20 years. Recent heart CT was uneventful. Further  discussion with cardiologist is pending.  - Continue monitoring with cardiologist, Dr. Cruz  - Discuss heart CT results with cardiologist in the fall    Hypertension  Blood pressure is well-controlled with current medication regimen. Home readings are slightly elevated when nervous, but in-office readings are consistently normal. No symptoms of dizziness, headache, chest pain, or dyspnea.  - Continue losartan for blood pressure management    Hyperlipidemia  LDL cholesterol is well-controlled at 65 mg/dL. He reports dietary indulgence in ice cream but maintains good lipid levels.  - Continue simvastatin 40 mg for cholesterol management    Gastroesophageal Reflux Disease (GERD)  GERD is managed with omeprazole. No current symptoms of dysphagia or changes in condition. Occasional use of antacids for rich meals after 7 PM.  - Continue omeprazole for GERD management  - Refill omeprazole prescription for one year    Retinal Detachment (Post-Surgical)  Post-surgical status post retinal detachment repair is stable. Follow-up with retinal specialist shows good recovery with minor visual distortion. He expresses satisfaction with current vision status.  - Continue follow-up with retinal specialist Dr. Chun    General Health Maintenance  He is physically active, biking regularly. Weight management is ongoing with a goal of reaching 200 lbs. Alcohol consumption has been reduced to weekends only. Immunizations are up to date except for Hepatitis A and B, which are recommended for international travel. He is considering international travel due to spouse's interest.  - Consider Hepatitis A and B vaccinations if planning international travel  - Continue regular physical activity and weight management  - Maintain reduced alcohol consumption    Follow-up  Routine follow-up and monitoring are planned. Labs will be ordered prior to the next physical examination to facilitate in-person discussion of results.  - Schedule follow-up  in six months for physical examination  - Order labs prior to next physical examination in the fall        The patient and provider have a longitudinal relationship to address/treat the serious or complex condition as stated in this encounter.      Meds This Visit:  Requested Prescriptions     Signed Prescriptions Disp Refills    losartan 50 MG Oral Tab 90 tablet 1     Sig: Take 1 tablet (50 mg total) by mouth daily.    simvastatin 40 MG Oral Tab 90 tablet 1     Sig: Take 1 tablet (40 mg total) by mouth nightly.    omeprazole 20 MG Oral Capsule Delayed Release 90 capsule 3     Sig: Take 1 capsule (20 mg total) by mouth every morning.       Health Maintenance:  Health Maintenance   Topic Date Due    Annual Physical  10/16/2024    PSA  11/12/2024    Colorectal Cancer Screening  02/24/2033    Influenza Vaccine  Completed    Annual Depression Screening  Completed    Fall Risk Screening (Annual)  Completed    Pneumococcal Vaccine: 65+ Years  Completed    Zoster Vaccines  Completed    COVID-19 Vaccine  Completed         Patient/Caregiver Education: Patient/Caregiver Education: There are no barriers to learning. Medical education done.   Outcome: Patient verbalizes understanding. Patient is notified to call with any questions, comp lications, allergies, or worsening or changing symptoms.  Patient is to call with any side effects or complications from the treatments as a result of today.     Problem List:     Patient Active Problem List   Diagnosis    Hardy's esophagus determined by endoscopy    Diaphragmatic hernia without mention of obstruction or gangrene    Essential hypertension    Mixed hyperlipidemia    Ascending aortic aneurysm    Dilatation of thoracic aorta    B12 deficiency    BMI 28.0-28.9,adult    Normocytic anemia due to blood loss    Iron deficiency anemia due to chronic blood loss    Family history of colon cancer    Diverticulosis of large intestine without perforation or abscess without bleeding    GERD  (gastroesophageal reflux disease)    Hardy's esophagus without dysplasia    Retinal tear of left eye    Lattice degeneration of left retina    Superficial keratitis of both eyes       Imaging & Referrals:  None     4/23/2024  Mary Harry, DO      Patient understands plan and follow-up.  Return in about 6 months (around 10/21/2025) for annual physical, fasting labs AM.

## 2025-07-17 ENCOUNTER — PATIENT MESSAGE (OUTPATIENT)
Dept: FAMILY MEDICINE CLINIC | Facility: CLINIC | Age: 70
End: 2025-07-17

## 2025-08-29 ENCOUNTER — OFFICE VISIT (OUTPATIENT)
Dept: FAMILY MEDICINE CLINIC | Facility: CLINIC | Age: 70
End: 2025-08-29

## 2025-08-29 VITALS
WEIGHT: 205 LBS | TEMPERATURE: 98 F | HEART RATE: 76 BPM | SYSTOLIC BLOOD PRESSURE: 122 MMHG | OXYGEN SATURATION: 98 % | RESPIRATION RATE: 18 BRPM | BODY MASS INDEX: 28.07 KG/M2 | DIASTOLIC BLOOD PRESSURE: 76 MMHG | HEIGHT: 71.65 IN

## 2025-08-29 DIAGNOSIS — L23.7 ALLERGIC CONTACT DERMATITIS DUE TO PLANTS, EXCEPT FOOD: Primary | ICD-10-CM

## 2025-08-29 PROCEDURE — 99213 OFFICE O/P EST LOW 20 MIN: CPT | Performed by: FAMILY MEDICINE

## (undated) DIAGNOSIS — Z13.6 SCREENING FOR CARDIOVASCULAR CONDITION: Primary | ICD-10-CM

## (undated) DEVICE — SOLUTION PREP 30ML 5% POVIDONE IOD EYE BDINE

## (undated) DEVICE — FILTER FLUID EYE E4429-22

## (undated) DEVICE — SOLUTION IRRIG 1000ML ST H2O AQUALITE PLAS

## (undated) DEVICE — 25GA BIPOLAR CAUTERY: Brand: SYNERGETICS

## (undated) DEVICE — PACK SRG BIOM FULL DRP STRL

## (undated) DEVICE — RETINAL: Brand: MEDLINE INDUSTRIES, INC.

## (undated) DEVICE — CATHETER URETH 14FR L16IN RED RUB RND HLLW

## (undated) DEVICE — Device: Brand: JELCO

## (undated) DEVICE — 25 GA ILLUMINATED FLEXIBLE CURVED LASER PROBE: Brand: ALCON, ENGAUGE

## (undated) DEVICE — SOLUTION IRRIG 500ML BAL SALT 2 CHMBR GLS BSS

## (undated) DEVICE — 3M™ MICROFOAM™ SURGICAL TAPE, 2 INCHES X 5 YARDS, 6 ROLLS/CARTON, 6 CARTONS/CASE, 1528-2: Brand: 3M™ MICROFOAM™

## (undated) DEVICE — 25+®GA HYPERVIT®  BEVEL 20K CPM TOTAL PLUS® VITRECTOMY PAK: Brand: CONSTELLATION® HYPERVIT® TOTAL PLUS®

## (undated) DEVICE — 25GA SOFT TIP CANNULA: Brand: SYNERGETICS

## (undated) DEVICE — SOLUTION IRRIG 250ML 0.9% NACL

## (undated) DEVICE — ENCORE® LATEX MICRO SIZE 6.5, STERILE LATEX POWDER-FREE SURGICAL GLOVE: Brand: ENCORE

## (undated) DEVICE — ISPAN C3F8 PERFLUOROPROPANEISPAN* C3F8 IS A FLUORINATED GREENHOUSE GAS COVERED BY THE KYOTO PROTOCOL AND HAS A GLOBAL WARMING POTENTIAL (GWP) OF 8,600. RESIDUAL ISPAN* C3F8 GAS SHOULD BE RECOVERED BY APPROPRIATELY QUALIFIED PERSONNEL AND RECYCLED, RECLAIMED OR DESTROYED IN ACCORDANCE WITH LOCAL ORDINANCES.: Brand: ISPAN

## (undated) DEVICE — APPLICATOR,COTTON-TIP,WOOD,3,STRL: Brand: MEDLINE

## (undated) NOTE — MR AVS SNAPSHOT
[facility], [address] [address] [id number]               Thank you for choosing us for your health care visit with [address] [place] [address], .   We are glad to serve you and happy to provide you with this s LDL cholesterol will build up in artery walls. This increases your risk for heart disease and stroke. · HDL (high-density lipoprotein) is known as \"good cholesterol. \" This protein shell collects excess cholesterol that LDLs have left behind on blood ves index) to a normal or near-normal level. Making diet changes and increasing physical activity can help. · Take medication as directed. Many people need medication to get their LDL levels to a safe level.  Medication to lower cholesterol levels is effective in the bloodstream. If your total cholesterol is high, follow the steps below to help lower your total cholesterol level:  · Eat less unhealthy fat:  · Cut back on saturated fats and trans (also called hydrogenated) fats by selecting lean cuts of meat, low © 9697-6716 [address], [address] [place] [address]. All rights reserved. This information is not intended as a substitute for professional medical care. Always follow your healthcare professional's instructions.         Magdiel Lazo · Begin an exercise program. Talk with your health care provider about the type of exercise program that would be best for you. It doesn't have to be hard. Even brisk walking for 20 minutes 3 times a week is a good form of exercise.   · Don’t take medicines © 0300-5806 [address], [address]. All rights reserved. This information is not intended as a substitute for professional medical care. Always follow your healthcare professional's instructions.              Al Educational Information     Healthy Diet and Regular Exercise  [employer] [facility] Access Information Management Drive for making healthy food choices  -   Enjoy your food, but eat less. Fully enjoy your food when eating. Don’t eat while distracted and slow down.    Avoid

## (undated) NOTE — LETTER
04/23/21        [address] [place] [address] Drive  [address] [facility]      Dear Carey Gama,    [facility] records indicate that you have outstanding lab work and or testing that was ordered for you and has not yet been completed:  Orders Placed This Encounter

## (undated) NOTE — LETTER
Patient Name: Adam Caballero  : 1955  MRN: [id number]  Patient Address: [place] [address] is committed to the safety and well-being of our patients, members, [facility] therapy. These treatments, when available and appropriate, should be given as soon as possible after diagnosis.       Seek Further Care      If you are awaiting test results or are confirmed positive for COVID-19, and your symptoms worsen at home with sympt doorknobs. Use household cleaning sprays or wipes according to the label instructions. Post-Discharge Follow-up  Please call your primary care provider within two days of your discharge to arrange for a telehealth follow-up.  CDC does not recommend Control & Prevention (CDC)  10 things you can do to manage your health at home, [email or web address]. pdf  [email or web address]